# Patient Record
Sex: FEMALE | Race: WHITE | NOT HISPANIC OR LATINO | Employment: OTHER | URBAN - METROPOLITAN AREA
[De-identification: names, ages, dates, MRNs, and addresses within clinical notes are randomized per-mention and may not be internally consistent; named-entity substitution may affect disease eponyms.]

---

## 2023-04-28 DIAGNOSIS — E78.00 HYPERCHOLESTEROLEMIA: Primary | ICD-10-CM

## 2023-05-12 DIAGNOSIS — E11.9 TYPE 2 DIABETES MELLITUS WITHOUT COMPLICATIONS (MULTI): ICD-10-CM

## 2023-05-12 RX ORDER — ATORVASTATIN CALCIUM 10 MG/1
TABLET, FILM COATED ORAL
Qty: 90 TABLET | Refills: 0 | Status: SHIPPED | OUTPATIENT
Start: 2023-05-12 | End: 2023-05-18 | Stop reason: SDUPTHER

## 2023-05-15 LAB
ESTIMATED AVERAGE GLUCOSE FOR HBA1C: 160 MG/DL
HEMOGLOBIN A1C/HEMOGLOBIN TOTAL IN BLOOD: 7.2 %

## 2023-05-18 ENCOUNTER — OFFICE VISIT (OUTPATIENT)
Dept: PRIMARY CARE | Facility: CLINIC | Age: 83
End: 2023-05-18
Payer: MEDICARE

## 2023-05-18 VITALS
OXYGEN SATURATION: 96 % | WEIGHT: 179.8 LBS | SYSTOLIC BLOOD PRESSURE: 130 MMHG | HEART RATE: 75 BPM | BODY MASS INDEX: 30.7 KG/M2 | HEIGHT: 64 IN | DIASTOLIC BLOOD PRESSURE: 64 MMHG

## 2023-05-18 DIAGNOSIS — N18.4 CONTROLLED TYPE 2 DIABETES MELLITUS WITH STAGE 4 CHRONIC KIDNEY DISEASE, WITHOUT LONG-TERM CURRENT USE OF INSULIN (MULTI): ICD-10-CM

## 2023-05-18 DIAGNOSIS — Z12.31 BREAST CANCER SCREENING BY MAMMOGRAM: ICD-10-CM

## 2023-05-18 DIAGNOSIS — E11.9 TYPE 2 DIABETES MELLITUS WITHOUT COMPLICATION, WITHOUT LONG-TERM CURRENT USE OF INSULIN (MULTI): ICD-10-CM

## 2023-05-18 DIAGNOSIS — E11.22 CONTROLLED TYPE 2 DIABETES MELLITUS WITH STAGE 4 CHRONIC KIDNEY DISEASE, WITHOUT LONG-TERM CURRENT USE OF INSULIN (MULTI): ICD-10-CM

## 2023-05-18 DIAGNOSIS — E78.00 HYPERCHOLESTEROLEMIA: ICD-10-CM

## 2023-05-18 DIAGNOSIS — M81.0 AGE-RELATED OSTEOPOROSIS WITHOUT CURRENT PATHOLOGICAL FRACTURE: ICD-10-CM

## 2023-05-18 DIAGNOSIS — N18.4 CHRONIC KIDNEY DISEASE WITH ACTIVE MEDICAL MANAGEMENT WITHOUT DIALYSIS, STAGE 4 (SEVERE) (MULTI): ICD-10-CM

## 2023-05-18 DIAGNOSIS — I10 PRIMARY HYPERTENSION: Primary | ICD-10-CM

## 2023-05-18 PROBLEM — H40.9 GLAUCOMA: Status: ACTIVE | Noted: 2023-05-18

## 2023-05-18 PROBLEM — D53.9 MACROCYTIC ANEMIA: Status: ACTIVE | Noted: 2023-05-18

## 2023-05-18 PROBLEM — R92.8 MAMMOGRAM ABNORMAL: Status: ACTIVE | Noted: 2023-05-18

## 2023-05-18 PROBLEM — N63.10 BREAST MASS, RIGHT: Status: ACTIVE | Noted: 2023-05-18

## 2023-05-18 PROBLEM — M25.551 RIGHT HIP PAIN: Status: ACTIVE | Noted: 2023-05-18

## 2023-05-18 PROBLEM — D64.9 ANEMIA: Status: ACTIVE | Noted: 2023-05-18

## 2023-05-18 PROBLEM — H90.3 BILATERAL SENSORINEURAL HEARING LOSS: Status: ACTIVE | Noted: 2023-05-18

## 2023-05-18 PROBLEM — E87.5 HYPERKALEMIA: Status: ACTIVE | Noted: 2023-05-18

## 2023-05-18 PROBLEM — N28.9 RENAL INSUFFICIENCY: Status: ACTIVE | Noted: 2023-05-18

## 2023-05-18 PROCEDURE — 1160F RVW MEDS BY RX/DR IN RCRD: CPT | Performed by: INTERNAL MEDICINE

## 2023-05-18 PROCEDURE — 99214 OFFICE O/P EST MOD 30 MIN: CPT | Performed by: INTERNAL MEDICINE

## 2023-05-18 PROCEDURE — 1036F TOBACCO NON-USER: CPT | Performed by: INTERNAL MEDICINE

## 2023-05-18 PROCEDURE — 1159F MED LIST DOCD IN RCRD: CPT | Performed by: INTERNAL MEDICINE

## 2023-05-18 PROCEDURE — 3075F SYST BP GE 130 - 139MM HG: CPT | Performed by: INTERNAL MEDICINE

## 2023-05-18 PROCEDURE — 3078F DIAST BP <80 MM HG: CPT | Performed by: INTERNAL MEDICINE

## 2023-05-18 RX ORDER — METOPROLOL TARTRATE 50 MG/1
50 TABLET ORAL
Qty: 180 TABLET | Refills: 3 | Status: SHIPPED | OUTPATIENT
Start: 2023-05-18 | End: 2024-05-31

## 2023-05-18 RX ORDER — SITAGLIPTIN 100 MG/1
100 TABLET, FILM COATED ORAL DAILY
COMMUNITY
End: 2023-12-13 | Stop reason: SDUPTHER

## 2023-05-18 RX ORDER — LYSINE HCL 500 MG
1 TABLET ORAL DAILY
COMMUNITY

## 2023-05-18 RX ORDER — PREDNISOLONE ACETATE 10 MG/ML
SUSPENSION/ DROPS OPHTHALMIC
COMMUNITY
Start: 2023-01-24

## 2023-05-18 RX ORDER — GLYBURIDE 2.5 MG/1
2.5 TABLET ORAL DAILY
Qty: 90 TABLET | Refills: 3 | Status: SHIPPED | OUTPATIENT
Start: 2023-05-18 | End: 2023-08-22 | Stop reason: SDUPTHER

## 2023-05-18 RX ORDER — ATORVASTATIN CALCIUM 10 MG/1
10 TABLET, FILM COATED ORAL DAILY
Qty: 90 TABLET | Refills: 3 | Status: SHIPPED | OUTPATIENT
Start: 2023-05-18 | End: 2023-08-15 | Stop reason: SDUPTHER

## 2023-05-18 RX ORDER — INSULIN GLARGINE 100 [IU]/ML
INJECTION, SOLUTION SUBCUTANEOUS
COMMUNITY
End: 2023-12-13 | Stop reason: SDUPTHER

## 2023-05-18 RX ORDER — PEN NEEDLE, DIABETIC 31 GX5/16"
NEEDLE, DISPOSABLE MISCELLANEOUS
COMMUNITY
Start: 2023-03-12 | End: 2024-01-18

## 2023-05-18 RX ORDER — GLYBURIDE 2.5 MG/1
2.5 TABLET ORAL DAILY
COMMUNITY
End: 2023-05-18 | Stop reason: SDUPTHER

## 2023-05-18 RX ORDER — METOPROLOL TARTRATE 50 MG/1
50 TABLET ORAL
COMMUNITY
Start: 2012-04-20 | End: 2023-05-18 | Stop reason: SDUPTHER

## 2023-05-18 RX ORDER — BRIMONIDINE TARTRATE, TIMOLOL MALEATE 2; 5 MG/ML; MG/ML
SOLUTION/ DROPS OPHTHALMIC
COMMUNITY
Start: 2016-04-08

## 2023-05-18 RX ORDER — LISINOPRIL 40 MG/1
40 TABLET ORAL DAILY
Qty: 90 TABLET | Refills: 3 | Status: SHIPPED | OUTPATIENT
Start: 2023-05-18

## 2023-05-18 RX ORDER — ALENDRONATE SODIUM 70 MG/1
70 TABLET ORAL
COMMUNITY
End: 2023-05-18 | Stop reason: SDUPTHER

## 2023-05-18 RX ORDER — DOXAZOSIN 2 MG/1
2 TABLET ORAL DAILY
COMMUNITY
End: 2023-12-13 | Stop reason: SINTOL

## 2023-05-18 RX ORDER — BLOOD SUGAR DIAGNOSTIC
STRIP MISCELLANEOUS
COMMUNITY
End: 2023-11-27 | Stop reason: SDUPTHER

## 2023-05-18 RX ORDER — LISINOPRIL 40 MG/1
40 TABLET ORAL DAILY
COMMUNITY
End: 2023-05-18 | Stop reason: SDUPTHER

## 2023-05-18 RX ORDER — CYANOCOBALAMIN (VITAMIN B-12) 500 MCG
1 TABLET ORAL DAILY
COMMUNITY

## 2023-05-18 RX ORDER — ALENDRONATE SODIUM 70 MG/1
70 TABLET ORAL
Qty: 13 TABLET | Refills: 3 | Status: SHIPPED | OUTPATIENT
Start: 2023-05-18 | End: 2024-02-29 | Stop reason: SDUPTHER

## 2023-05-18 RX ORDER — AMLODIPINE BESYLATE 10 MG/1
10 TABLET ORAL DAILY
COMMUNITY
End: 2023-12-12 | Stop reason: SDUPTHER

## 2023-05-18 RX ORDER — TRAVOPROST OPHTHALMIC SOLUTION 0.04 MG/ML
1 SOLUTION OPHTHALMIC NIGHTLY
COMMUNITY
Start: 2023-04-13

## 2023-05-18 ASSESSMENT — PATIENT HEALTH QUESTIONNAIRE - PHQ9
1. LITTLE INTEREST OR PLEASURE IN DOING THINGS: NOT AT ALL
SUM OF ALL RESPONSES TO PHQ9 QUESTIONS 1 AND 2: 0
2. FEELING DOWN, DEPRESSED OR HOPELESS: NOT AT ALL

## 2023-05-18 ASSESSMENT — ENCOUNTER SYMPTOMS
HEMATURIA: 0
FEVER: 0
FREQUENCY: 0
HEADACHES: 0
SORE THROAT: 0
ARTHRALGIAS: 0
COUGH: 0
MYALGIAS: 0
NAUSEA: 0
DIARRHEA: 0
DIFFICULTY URINATING: 0
LIGHT-HEADEDNESS: 0
SHORTNESS OF BREATH: 0
DYSURIA: 0
CHILLS: 0
WEAKNESS: 0
PALPITATIONS: 0
CONSTIPATION: 0
VOMITING: 0
DIZZINESS: 0
FATIGUE: 0

## 2023-05-18 ASSESSMENT — PAIN SCALES - GENERAL: PAINLEVEL: 0-NO PAIN

## 2023-05-18 NOTE — ASSESSMENT & PLAN NOTE
Patient's A1c is 7.2% so she was encouraged to work on her diet and we will check another A1c prior to her next appointment which will be in 4 months

## 2023-05-18 NOTE — PATIENT INSTRUCTIONS
Follow up Dr Vance in 4 months for HTN, DM etc    Please get A1c before next appointment  (but after 8/15/23)

## 2023-05-18 NOTE — ASSESSMENT & PLAN NOTE
Patient was reminded to watch her NSAID usage and continue drinking fluids and this will be checked periodically

## 2023-05-18 NOTE — ASSESSMENT & PLAN NOTE
>>ASSESSMENT AND PLAN FOR TYPE 2 DIABETES MELLITUS (CMS/[facility]) WRITTEN ON 5/18/2023  1:57 PM BY BRUCE KRAFT MD    Patient's A1c is 7.2% so she was encouraged to work on her diet and we will check another A1c prior to her next appointment which will be in 4 months

## 2023-05-18 NOTE — ASSESSMENT & PLAN NOTE
Patient was given a refill on her alendronate and reminded to continue taking calcium and vitamin D.

## 2023-05-18 NOTE — PROGRESS NOTES
"Subjective   Patient ID: Hyacinth Crisostomo is a 83 y.o. female who presents for 4 month re for diabetes management.  BN    Is here for 4-month follow-up on hypertension diabetes and medication management.  Her May most recent hemoglobin A1c done on May 15 at 7.2%.  She admits she is really not been following her diet and she was counseled and encouraged to start watching her carbohydrates.        Review of Systems   Constitutional:  Negative for chills, fatigue and fever.   HENT:  Negative for sore throat.    Eyes:  Negative for visual disturbance.   Respiratory:  Negative for cough and shortness of breath.    Cardiovascular:  Negative for chest pain, palpitations and leg swelling.   Gastrointestinal:  Negative for constipation, diarrhea, nausea and vomiting.   Genitourinary:  Negative for difficulty urinating, dysuria, frequency, hematuria and urgency.   Musculoskeletal:  Negative for arthralgias and myalgias.   Skin:  Negative for rash.   Neurological:  Negative for dizziness, syncope, weakness, light-headedness and headaches.       Objective   Medication Documentation Review Audit       Reviewed by Lucita Vance MD (Physician) on 05/18/23 at 1354      Medication Order Taking? Sig Documenting Provider Last Dose Status     Discontinued 05/18/23 1342   alendronate (Fosamax) 70 mg tablet [id number]  Take 1 tablet (70 mg) by mouth 1 (one) time per week. Lucita Vance MD  Active   amLODIPine (Norvasc) 10 mg tablet [id number]  Take 1 tablet (10 mg) by mouth once daily. Historical Provider, MD  Active     Discontinued 05/18/23 1342   atorvastatin (Lipitor) 10 mg tablet [id number]  Take 1 tablet (10 mg) by mouth once daily. as directed Lucita Vance MD  Active   BD Ultra-Fine Short Pen Needle 31 gauge x 5/16\" needle [id number]  USE AS DIRECTED. Historical Provider, MD  Active   calcium carbonate-vit D3-min 600 mg calcium- 400 unit tablet [id number]  Take 1 tablet by mouth once daily. Historical Provider, MD  Active "   cholecalciferol (Vitamin D-3) 10 MCG (400 UNIT) tablet [id number]  Take 1 tablet (10 mcg) by mouth once daily. Historical Provider, MD  Active   Combigan 0.2-0.5 % ophthalmic solution [id number]  Administer into affected eye(s). Historical Provider, MD  Active   doxazosin (Cardura) 2 mg tablet [id number]  Take 1 tablet (2 mg) by mouth once daily. Historical Provider, MD  Active     Discontinued 05/18/23 1342   glyBURIDE (Diabeta) 2.5 mg tablet [id number]  Take 1 tablet (2.5 mg) by mouth once daily. Lucita Vance MD  Active   Januvia 100 mg tablet [id number]  Take 1 tablet (100 mg) by mouth once daily. Historical Provider, MD  Active   Lantus Solostar U-100 Insulin 100 unit/mL (3 mL) pen [id number]  INJECT 15 UNITS EVERY NIGHT. Historical Provider, MD  Active     Discontinued 05/18/23 1342   lisinopril 40 mg tablet [id number]  Take 1 tablet (40 mg) by mouth once daily. Lucita Vance MD  Active     Discontinued 05/18/23 1342   metoprolol tartrate (Lopressor) 50 mg tablet [id number]  Take 1 tablet (50 mg) by mouth 2 times a day with meals. Lucita Vance MD  Active   OneTouch Ultra Test strip [id number]  USE 1 STRIP DAILY AND AS NEEDED IF SYMPTOMATIC Historical Provider, MD  Active   prednisoLONE acetate (Pred-Forte) 1 % ophthalmic suspension [id number]  INSTILL 1 DROP INTO LEFT EYE ONCE A DAY Historical Provider, MD  Active   travoprost (Travatan Z) 0.004 % drops ophthalmic solution [id number]  Administer 1 drop into both eyes once daily at bedtime. Historical Provider, MD  Active                  Physical Exam  Constitutional:       Appearance: Normal appearance.   HENT:      Head: Normocephalic and atraumatic.      Nose: Nose normal.   Eyes:      Extraocular Movements: Extraocular movements intact.      Pupils: Pupils are equal, round, and reactive to light.   Cardiovascular:      Rate and Rhythm: Normal rate and regular rhythm.   Pulmonary:      Breath sounds: Normal breath sounds.   Abdominal:      General: Abdomen is flat.  "Bowel sounds are normal.      Palpations: Abdomen is soft.   Musculoskeletal:      Right lower leg: No edema.      Left lower leg: No edema.   Neurological:      Mental Status: She is alert.     /64   Pulse 75   Ht 1.626 m (5' 4\")   Wt 81.6 kg (179 lb 12.8 oz)   SpO2 96%   BMI 30.86 kg/m²       Assessment/Plan   Problem List Items Addressed This Visit          Circulatory    Hypertension - Primary     Blood pressure is stable at 130/64 so she was given refills on lisinopril and metoprolol.         Relevant Medications    lisinopril 40 mg tablet    metoprolol tartrate (Lopressor) 50 mg tablet       Genitourinary    Chronic kidney disease with active medical management without dialysis, stage 4 (severe) (CMS/[facility])     Patient was reminded to watch her NSAID usage and continue drinking fluids and this will be checked periodically            Musculoskeletal    Age related osteoporosis     Patient was given a refill on her alendronate and reminded to continue taking calcium and vitamin D.         Relevant Medications    alendronate (Fosamax) 70 mg tablet       Endocrine/Metabolic    Controlled diabetes mellitus with chronic kidney disease (CMS/[facility])    Type 2 diabetes mellitus (CMS/[facility])     Patient's A1c is 7.2% so she was encouraged to work on her diet and we will check another A1c prior to her next appointment which will be in 4 months         Relevant Medications    atorvastatin (Lipitor) 10 mg tablet    lisinopril 40 mg tablet    glyBURIDE (Diabeta) 2.5 mg tablet    Other Relevant Orders    Hemoglobin A1C       Other    Hypercholesterolemia     Patient was given a refill on her atorvastatin and cholesterol profile was checked in January         Relevant Medications    atorvastatin (Lipitor) 10 mg tablet     Other Visit Diagnoses       Breast cancer screening by mammogram        Relevant Orders    BI mammo bilateral screening tomosynthesis                   It has been a pleasure seeing you.    "

## 2023-05-24 RX ORDER — GLYBURIDE 2.5 MG/1
TABLET ORAL
Qty: 90 TABLET | Refills: 3 | OUTPATIENT
Start: 2023-05-24

## 2023-06-15 LAB
ANION GAP IN SER/PLAS: 15 MMOL/L (ref 10–20)
APPEARANCE, URINE: ABNORMAL
BACTERIA, URINE: ABNORMAL /HPF
BILIRUBIN, URINE: NEGATIVE
BLOOD, URINE: NEGATIVE
CALCIDIOL (25 OH VITAMIN D3) (NG/ML) IN SER/PLAS: 37 NG/ML
CALCIUM (MG/DL) IN SER/PLAS: 9.3 MG/DL (ref 8.6–10.6)
CARBON DIOXIDE, TOTAL (MMOL/L) IN SER/PLAS: 22 MMOL/L (ref 21–32)
CHLORIDE (MMOL/L) IN SER/PLAS: 107 MMOL/L (ref 98–107)
COLOR, URINE: YELLOW
CREATININE (MG/DL) IN SER/PLAS: 1.92 MG/DL (ref 0.5–1.05)
CREATININE (MG/DL) IN URINE: 173 MG/DL (ref 20–320)
ERYTHROCYTE DISTRIBUTION WIDTH (RATIO) BY AUTOMATED COUNT: 12.4 % (ref 11.5–14.5)
ERYTHROCYTE MEAN CORPUSCULAR HEMOGLOBIN CONCENTRATION (G/DL) BY AUTOMATED: 31.4 G/DL (ref 32–36)
ERYTHROCYTE MEAN CORPUSCULAR VOLUME (FL) BY AUTOMATED COUNT: 95 FL (ref 80–100)
ERYTHROCYTES (10*6/UL) IN BLOOD BY AUTOMATED COUNT: 4.01 X10E12/L (ref 4–5.2)
GFR FEMALE: 26 ML/MIN/1.73M2
GLUCOSE (MG/DL) IN SER/PLAS: 228 MG/DL (ref 74–99)
GLUCOSE, URINE: NEGATIVE MG/DL
HEMATOCRIT (%) IN BLOOD BY AUTOMATED COUNT: 37.9 % (ref 36–46)
HEMOGLOBIN (G/DL) IN BLOOD: 11.9 G/DL (ref 12–16)
KETONES, URINE: NEGATIVE MG/DL
LEUKOCYTE ESTERASE, URINE: ABNORMAL
LEUKOCYTES (10*3/UL) IN BLOOD BY AUTOMATED COUNT: 8.6 X10E9/L (ref 4.4–11.3)
MAGNESIUM (MG/DL) IN SER/PLAS: 1.93 MG/DL (ref 1.6–2.4)
MUCUS, URINE: ABNORMAL /LPF
NITRITE, URINE: NEGATIVE
NRBC (PER 100 WBCS) BY AUTOMATED COUNT: 0 /100 WBC (ref 0–0)
PH, URINE: 5 (ref 5–8)
PHOSPHATE (MG/DL) IN SER/PLAS: 4.1 MG/DL (ref 2.5–4.9)
PLATELETS (10*3/UL) IN BLOOD AUTOMATED COUNT: 219 X10E9/L (ref 150–450)
POTASSIUM (MMOL/L) IN SER/PLAS: 5.3 MMOL/L (ref 3.5–5.3)
PROTEIN (MG/DL) IN URINE: 18 MG/DL (ref 5–24)
PROTEIN, URINE: NEGATIVE MG/DL
PROTEIN/CREATININE (MG/MG) IN URINE: 0.1 MG/MG CREAT (ref 0–0.17)
RBC, URINE: 4 /HPF (ref 0–5)
SODIUM (MMOL/L) IN SER/PLAS: 139 MMOL/L (ref 136–145)
SPECIFIC GRAVITY, URINE: 1.01 (ref 1–1.03)
SQUAMOUS EPITHELIAL CELLS, URINE: 7 /HPF
URATE (MG/DL) IN SER/PLAS: 7.4 MG/DL (ref 2.3–6.7)
UREA NITROGEN (MG/DL) IN SER/PLAS: 33 MG/DL (ref 6–23)
UROBILINOGEN, URINE: <2 MG/DL (ref 0–1.9)
WBC CLUMPS, URINE: ABNORMAL /HPF
WBC, URINE: 33 /HPF (ref 0–5)

## 2023-07-26 ENCOUNTER — TELEPHONE (OUTPATIENT)
Dept: PRIMARY CARE | Facility: CLINIC | Age: 83
End: 2023-07-26
Payer: MEDICARE

## 2023-07-26 NOTE — TELEPHONE ENCOUNTER
Patient notified and read message.  BN    ----- Message from Lucita Vance MD sent at 7/26/2023  7:45 AM EDT -----  Please notify patient that her mammogram and ultrasound both show that the lesion in question has decreased in size and is therefore benign.  No further follow-up is needed at this time.    See both mammogram and ultrasound results management notes.

## 2023-08-15 DIAGNOSIS — E11.9 TYPE 2 DIABETES MELLITUS WITHOUT COMPLICATION, WITHOUT LONG-TERM CURRENT USE OF INSULIN (MULTI): ICD-10-CM

## 2023-08-15 DIAGNOSIS — E78.00 HYPERCHOLESTEROLEMIA: ICD-10-CM

## 2023-08-15 RX ORDER — ATORVASTATIN CALCIUM 10 MG/1
10 TABLET, FILM COATED ORAL DAILY
Qty: 90 TABLET | Refills: 0 | Status: SHIPPED | OUTPATIENT
Start: 2023-08-15 | End: 2023-12-12 | Stop reason: SDUPTHER

## 2023-08-15 NOTE — TELEPHONE ENCOUNTER
Med refill form voicemail    atorvastatin (Lipitor) 10 mg tablet      Take 1 tablet (10 mg) by mouth once daily.     GUY CROWDER

## 2023-08-16 ENCOUNTER — LAB (OUTPATIENT)
Dept: LAB | Facility: LAB | Age: 83
End: 2023-08-16
Payer: MEDICARE

## 2023-08-16 DIAGNOSIS — E11.9 TYPE 2 DIABETES MELLITUS WITHOUT COMPLICATION, WITHOUT LONG-TERM CURRENT USE OF INSULIN (MULTI): ICD-10-CM

## 2023-08-16 PROCEDURE — 83036 HEMOGLOBIN GLYCOSYLATED A1C: CPT

## 2023-08-16 PROCEDURE — 36415 COLL VENOUS BLD VENIPUNCTURE: CPT

## 2023-08-17 LAB
ESTIMATED AVERAGE GLUCOSE FOR HBA1C: 146 MG/DL
HEMOGLOBIN A1C/HEMOGLOBIN TOTAL IN BLOOD: 6.7 %

## 2023-08-22 ENCOUNTER — OFFICE VISIT (OUTPATIENT)
Dept: PRIMARY CARE | Facility: CLINIC | Age: 83
End: 2023-08-22
Payer: MEDICARE

## 2023-08-22 VITALS
BODY MASS INDEX: 30.19 KG/M2 | HEIGHT: 64 IN | SYSTOLIC BLOOD PRESSURE: 119 MMHG | OXYGEN SATURATION: 96 % | DIASTOLIC BLOOD PRESSURE: 58 MMHG | WEIGHT: 176.8 LBS | HEART RATE: 65 BPM

## 2023-08-22 DIAGNOSIS — E11.9 TYPE 2 DIABETES MELLITUS WITHOUT COMPLICATION, WITHOUT LONG-TERM CURRENT USE OF INSULIN (MULTI): ICD-10-CM

## 2023-08-22 DIAGNOSIS — I10 PRIMARY HYPERTENSION: Primary | ICD-10-CM

## 2023-08-22 DIAGNOSIS — E78.00 HYPERCHOLESTEROLEMIA: ICD-10-CM

## 2023-08-22 DIAGNOSIS — N18.4 CHRONIC KIDNEY DISEASE WITH ACTIVE MEDICAL MANAGEMENT WITHOUT DIALYSIS, STAGE 4 (SEVERE) (MULTI): ICD-10-CM

## 2023-08-22 PROBLEM — N28.9 RENAL INSUFFICIENCY: Status: ACTIVE | Noted: 2023-08-22

## 2023-08-22 PROBLEM — M25.551 RIGHT HIP PAIN: Status: RESOLVED | Noted: 2023-05-18 | Resolved: 2023-08-22

## 2023-08-22 PROBLEM — N63.10 BREAST MASS, RIGHT: Status: RESOLVED | Noted: 2023-05-18 | Resolved: 2023-08-22

## 2023-08-22 PROBLEM — D53.9 MACROCYTIC ANEMIA: Status: RESOLVED | Noted: 2023-05-18 | Resolved: 2023-08-22

## 2023-08-22 PROBLEM — D64.9 ANEMIA: Status: RESOLVED | Noted: 2023-05-18 | Resolved: 2023-08-22

## 2023-08-22 PROCEDURE — 1126F AMNT PAIN NOTED NONE PRSNT: CPT | Performed by: INTERNAL MEDICINE

## 2023-08-22 PROCEDURE — 3078F DIAST BP <80 MM HG: CPT | Performed by: INTERNAL MEDICINE

## 2023-08-22 PROCEDURE — 1036F TOBACCO NON-USER: CPT | Performed by: INTERNAL MEDICINE

## 2023-08-22 PROCEDURE — 1159F MED LIST DOCD IN RCRD: CPT | Performed by: INTERNAL MEDICINE

## 2023-08-22 PROCEDURE — 3074F SYST BP LT 130 MM HG: CPT | Performed by: INTERNAL MEDICINE

## 2023-08-22 PROCEDURE — 1160F RVW MEDS BY RX/DR IN RCRD: CPT | Performed by: INTERNAL MEDICINE

## 2023-08-22 PROCEDURE — 99214 OFFICE O/P EST MOD 30 MIN: CPT | Performed by: INTERNAL MEDICINE

## 2023-08-22 RX ORDER — GLYBURIDE 2.5 MG/1
2.5 TABLET ORAL DAILY
Qty: 90 TABLET | Refills: 3 | Status: SHIPPED | OUTPATIENT
Start: 2023-08-22 | End: 2024-02-29 | Stop reason: SDUPTHER

## 2023-08-22 ASSESSMENT — ENCOUNTER SYMPTOMS
FATIGUE: 0
SORE THROAT: 0
HEADACHES: 0
HEMATURIA: 0
DIARRHEA: 0
FEVER: 0
MYALGIAS: 0
NAUSEA: 0
DYSURIA: 0
CHILLS: 0
PALPITATIONS: 0
LIGHT-HEADEDNESS: 0
DIZZINESS: 0
VOMITING: 0
SHORTNESS OF BREATH: 0
ARTHRALGIAS: 0
CONSTIPATION: 0
WEAKNESS: 0
COUGH: 0
DIFFICULTY URINATING: 0
FREQUENCY: 0

## 2023-08-22 ASSESSMENT — PATIENT HEALTH QUESTIONNAIRE - PHQ9
2. FEELING DOWN, DEPRESSED OR HOPELESS: NOT AT ALL
1. LITTLE INTEREST OR PLEASURE IN DOING THINGS: NOT AT ALL
SUM OF ALL RESPONSES TO PHQ9 QUESTIONS 1 AND 2: 0

## 2023-08-22 ASSESSMENT — PAIN SCALES - GENERAL: PAINLEVEL: 0-NO PAIN

## 2023-08-22 NOTE — ASSESSMENT & PLAN NOTE
>>ASSESSMENT AND PLAN FOR TYPE 2 DIABETES MELLITUS (CMS/[facility]) WRITTEN ON 8/22/2023  2:29 PM BY BRUCE KRAFT MD    Diabetes is doing well on her current A1c is 6.7%.  She will check another A1c prior to her next appointment in 4 months

## 2023-08-22 NOTE — PROGRESS NOTES
"Subjective   Patient ID: Hyacinth Crisostomo is a 83 y.o. female who presents for 4 month follow up for HTN, cholesterol, and diabetes management.  BN    Patient is here for routine follow-up of hypertension, cholesterol, diabetes and medication management.    Patient had a hemoglobin A1c on August 16 and it was 6.7% which had improved compared to her prior reading at 7.2%.  She admits she really has not changed much and thinks that she just that it is summertime and she is not more active which is why her A1c has improved.        Review of Systems   Constitutional:  Negative for chills, fatigue and fever.   HENT:  Negative for sore throat.    Eyes:  Negative for visual disturbance.   Respiratory:  Negative for cough and shortness of breath.    Cardiovascular:  Negative for chest pain, palpitations and leg swelling.   Gastrointestinal:  Negative for constipation, diarrhea, nausea and vomiting.   Genitourinary:  Negative for difficulty urinating, dysuria, frequency, hematuria and urgency.   Musculoskeletal:  Negative for arthralgias and myalgias.   Skin:  Negative for rash.   Neurological:  Negative for dizziness, syncope, weakness, light-headedness and headaches.       Objective   Medication Documentation Review Audit       Reviewed by Lucita Vance MD (Physician) on 05/18/23 at 1354      Medication Order Taking? Sig Documenting Provider Last Dose Status     Discontinued 05/18/23 1342   alendronate (Fosamax) 70 mg tablet [id number]  Take 1 tablet (70 mg) by mouth 1 (one) time per week. Lucita Vance MD  Active   amLODIPine (Norvasc) 10 mg tablet [id number]  Take 1 tablet (10 mg) by mouth once daily. Historical Provider, MD  Active     Discontinued 05/18/23 1342   atorvastatin (Lipitor) 10 mg tablet [id number]  Take 1 tablet (10 mg) by mouth once daily. as directed Lucita Vance MD  Active   BD Ultra-Fine Short Pen Needle 31 gauge x 5/16\" needle [id number]  USE AS DIRECTED. Historical Provider, MD  Active   calcium " carbonate-vit D3-min 600 mg calcium- 400 unit tablet [id number]  Take 1 tablet by mouth once daily. Historical Provider, MD  Active   cholecalciferol (Vitamin D-3) 10 MCG (400 UNIT) tablet [id number]  Take 1 tablet (10 mcg) by mouth once daily. Historical Provider, MD  Active   Combigan 0.2-0.5 % ophthalmic solution [id number]  Administer into affected eye(s). Historical Provider, MD  Active   doxazosin (Cardura) 2 mg tablet [id number]  Take 1 tablet (2 mg) by mouth once daily. Historical Provider, MD  Active     Discontinued 05/18/23 1342   glyBURIDE (Diabeta) 2.5 mg tablet [id number]  Take 1 tablet (2.5 mg) by mouth once daily. Lucita Vance MD  Active   Januvia 100 mg tablet [id number]  Take 1 tablet (100 mg) by mouth once daily. Historical Provider, MD  Active   Lantus Solostar U-100 Insulin 100 unit/mL (3 mL) pen [id number]  INJECT 15 UNITS EVERY NIGHT. Historical Provider, MD  Active     Discontinued 05/18/23 1342   lisinopril 40 mg tablet [id number]  Take 1 tablet (40 mg) by mouth once daily. Lucita Vance MD  Active     Discontinued 05/18/23 1342   metoprolol tartrate (Lopressor) 50 mg tablet [id number]  Take 1 tablet (50 mg) by mouth 2 times a day with meals. Lucita Vance MD  Active   OneTouch Ultra Test strip [id number]  USE 1 STRIP DAILY AND AS NEEDED IF SYMPTOMATIC Historical Provider, MD  Active   prednisoLONE acetate (Pred-Forte) 1 % ophthalmic suspension [id number]  INSTILL 1 DROP INTO LEFT EYE ONCE A DAY Historical Provider, MD  Active   travoprost (Travatan Z) 0.004 % drops ophthalmic solution [id number]  Administer 1 drop into both eyes once daily at bedtime. Historical Provider, MD  Active                  Physical Exam  Constitutional:       Appearance: Normal appearance.   HENT:      Head: Normocephalic and atraumatic.      Nose: Nose normal.   Eyes:      Extraocular Movements: Extraocular movements intact.      Pupils: Pupils are equal, round, and reactive to light.   Cardiovascular:      Rate and Rhythm:  "Normal rate and regular rhythm.   Pulmonary:      Breath sounds: Normal breath sounds.   Abdominal:      General: Abdomen is flat. Bowel sounds are normal.      Palpations: Abdomen is soft.   Musculoskeletal:      Right lower leg: No edema.      Left lower leg: No edema.   Neurological:      Mental Status: She is alert.     /58 (BP Location: Left arm, Patient Position: Sitting)   Pulse 65   Ht 1.626 m (5' 4\")   Wt 80.2 kg (176 lb 12.8 oz)   SpO2 96%   BMI 30.35 kg/m²       Assessment/Plan   Problem List Items Addressed This Visit       Chronic kidney disease with active medical management without dialysis, stage 4 (severe) (CMS/[facility])     Patient was reminded to drink plenty of fluids and avoid NSAIDs.         Type 2 diabetes mellitus (CMS/[facility])     Diabetes is doing well on her current A1c is 6.7%.  She will check another A1c prior to her next appointment in 4 months         Relevant Medications    glyBURIDE (Diabeta) 2.5 mg tablet    Other Relevant Orders    Hemoglobin A1C    Hypercholesterolemia     Cholesterol is stable on atorvastatin 10 mg daily and annual labs were done in January.         Hypertension - Primary     Blood pressure is stable and well-controlled.          Follow-up in 4 months with an A1c at that time for diabetes, hypertension and cholesterol.  Annual wellness visit and annual labs are due in January 2024         It has been a pleasure seeing you.    "

## 2023-08-22 NOTE — ASSESSMENT & PLAN NOTE
Diabetes is doing well on her current A1c is 6.7%.  She will check another A1c prior to her next appointment in 4 months

## 2023-11-05 DIAGNOSIS — E11.9 TYPE 2 DIABETES MELLITUS WITHOUT COMPLICATIONS (MULTI): ICD-10-CM

## 2023-11-07 RX ORDER — BLOOD SUGAR DIAGNOSTIC
STRIP MISCELLANEOUS
Qty: 100 STRIP | Refills: 2 | OUTPATIENT
Start: 2023-11-07

## 2023-11-22 ENCOUNTER — LAB (OUTPATIENT)
Dept: LAB | Facility: LAB | Age: 83
End: 2023-11-22
Payer: MEDICARE

## 2023-11-22 DIAGNOSIS — E11.9 TYPE 2 DIABETES MELLITUS WITHOUT COMPLICATION, WITHOUT LONG-TERM CURRENT USE OF INSULIN (MULTI): ICD-10-CM

## 2023-11-22 DIAGNOSIS — N18.4 CHRONIC KIDNEY DISEASE, STAGE 4 (SEVERE) (MULTI): Primary | ICD-10-CM

## 2023-11-22 PROCEDURE — 80048 BASIC METABOLIC PNL TOTAL CA: CPT

## 2023-11-22 PROCEDURE — 36415 COLL VENOUS BLD VENIPUNCTURE: CPT

## 2023-11-22 PROCEDURE — 83036 HEMOGLOBIN GLYCOSYLATED A1C: CPT

## 2023-11-23 LAB
ANION GAP SERPL CALC-SCNC: 15 MMOL/L (ref 10–20)
BUN SERPL-MCNC: 48 MG/DL (ref 6–23)
CALCIUM SERPL-MCNC: 9.4 MG/DL (ref 8.6–10.6)
CHLORIDE SERPL-SCNC: 111 MMOL/L (ref 98–107)
CO2 SERPL-SCNC: 21 MMOL/L (ref 21–32)
CREAT SERPL-MCNC: 1.91 MG/DL (ref 0.5–1.05)
EST. AVERAGE GLUCOSE BLD GHB EST-MCNC: 154 MG/DL
GFR SERPL CREATININE-BSD FRML MDRD: 26 ML/MIN/1.73M*2
GLUCOSE SERPL-MCNC: 138 MG/DL (ref 74–99)
HBA1C MFR BLD: 7 %
POTASSIUM SERPL-SCNC: 5.1 MMOL/L (ref 3.5–5.3)
SODIUM SERPL-SCNC: 142 MMOL/L (ref 136–145)

## 2023-11-27 DIAGNOSIS — N18.4 CONTROLLED TYPE 2 DIABETES MELLITUS WITH STAGE 4 CHRONIC KIDNEY DISEASE, WITHOUT LONG-TERM CURRENT USE OF INSULIN (MULTI): ICD-10-CM

## 2023-11-27 DIAGNOSIS — E11.22 CONTROLLED TYPE 2 DIABETES MELLITUS WITH STAGE 4 CHRONIC KIDNEY DISEASE, WITHOUT LONG-TERM CURRENT USE OF INSULIN (MULTI): ICD-10-CM

## 2023-11-27 RX ORDER — BLOOD SUGAR DIAGNOSTIC
STRIP MISCELLANEOUS
Qty: 100 STRIP | Refills: 0 | Status: SHIPPED | OUTPATIENT
Start: 2023-11-27 | End: 2023-12-12 | Stop reason: SDUPTHER

## 2023-12-12 ENCOUNTER — OFFICE VISIT (OUTPATIENT)
Dept: PRIMARY CARE | Facility: CLINIC | Age: 83
End: 2023-12-12
Payer: MEDICARE

## 2023-12-12 VITALS
HEIGHT: 64 IN | DIASTOLIC BLOOD PRESSURE: 52 MMHG | BODY MASS INDEX: 30.39 KG/M2 | HEART RATE: 67 BPM | SYSTOLIC BLOOD PRESSURE: 128 MMHG | OXYGEN SATURATION: 99 % | WEIGHT: 178 LBS

## 2023-12-12 DIAGNOSIS — E78.00 HYPERCHOLESTEROLEMIA: ICD-10-CM

## 2023-12-12 DIAGNOSIS — I10 PRIMARY HYPERTENSION: Primary | ICD-10-CM

## 2023-12-12 DIAGNOSIS — N18.4 CHRONIC KIDNEY DISEASE WITH ACTIVE MEDICAL MANAGEMENT WITHOUT DIALYSIS, STAGE 4 (SEVERE) (MULTI): ICD-10-CM

## 2023-12-12 DIAGNOSIS — E11.9 TYPE 2 DIABETES MELLITUS WITHOUT COMPLICATION, WITHOUT LONG-TERM CURRENT USE OF INSULIN (MULTI): ICD-10-CM

## 2023-12-12 DIAGNOSIS — N28.9 RENAL INSUFFICIENCY: ICD-10-CM

## 2023-12-12 DIAGNOSIS — N18.4 CONTROLLED TYPE 2 DIABETES MELLITUS WITH STAGE 4 CHRONIC KIDNEY DISEASE, WITHOUT LONG-TERM CURRENT USE OF INSULIN (MULTI): ICD-10-CM

## 2023-12-12 DIAGNOSIS — E11.22 CONTROLLED TYPE 2 DIABETES MELLITUS WITH STAGE 4 CHRONIC KIDNEY DISEASE, WITHOUT LONG-TERM CURRENT USE OF INSULIN (MULTI): ICD-10-CM

## 2023-12-12 PROCEDURE — 3074F SYST BP LT 130 MM HG: CPT | Performed by: INTERNAL MEDICINE

## 2023-12-12 PROCEDURE — 99214 OFFICE O/P EST MOD 30 MIN: CPT | Performed by: INTERNAL MEDICINE

## 2023-12-12 PROCEDURE — 1160F RVW MEDS BY RX/DR IN RCRD: CPT | Performed by: INTERNAL MEDICINE

## 2023-12-12 PROCEDURE — 1159F MED LIST DOCD IN RCRD: CPT | Performed by: INTERNAL MEDICINE

## 2023-12-12 PROCEDURE — 1126F AMNT PAIN NOTED NONE PRSNT: CPT | Performed by: INTERNAL MEDICINE

## 2023-12-12 PROCEDURE — 1036F TOBACCO NON-USER: CPT | Performed by: INTERNAL MEDICINE

## 2023-12-12 PROCEDURE — 3078F DIAST BP <80 MM HG: CPT | Performed by: INTERNAL MEDICINE

## 2023-12-12 RX ORDER — BLOOD SUGAR DIAGNOSTIC
STRIP MISCELLANEOUS
Qty: 100 STRIP | Refills: 3 | Status: SHIPPED | OUTPATIENT
Start: 2023-12-12 | End: 2024-02-29 | Stop reason: SDUPTHER

## 2023-12-12 RX ORDER — ATORVASTATIN CALCIUM 10 MG/1
10 TABLET, FILM COATED ORAL DAILY
Qty: 90 TABLET | Refills: 3 | Status: SHIPPED | OUTPATIENT
Start: 2023-12-12

## 2023-12-12 RX ORDER — AMLODIPINE BESYLATE 10 MG/1
10 TABLET ORAL DAILY
Qty: 90 TABLET | Refills: 3 | Status: SHIPPED | OUTPATIENT
Start: 2023-12-12 | End: 2024-02-29 | Stop reason: SDUPTHER

## 2023-12-12 ASSESSMENT — ENCOUNTER SYMPTOMS
DYSURIA: 0
SHORTNESS OF BREATH: 0
FEVER: 0
DIARRHEA: 0
FATIGUE: 0
DIFFICULTY URINATING: 0
HEMATURIA: 0
NAUSEA: 0
CHILLS: 0
VOMITING: 0
COUGH: 0
CONSTIPATION: 0
LIGHT-HEADEDNESS: 0
FREQUENCY: 0
DIZZINESS: 0
HEADACHES: 0
WEAKNESS: 0
ARTHRALGIAS: 0
PALPITATIONS: 0
MYALGIAS: 0
SORE THROAT: 0

## 2023-12-12 ASSESSMENT — PAIN SCALES - GENERAL: PAINLEVEL: 0-NO PAIN

## 2023-12-12 ASSESSMENT — PATIENT HEALTH QUESTIONNAIRE - PHQ9
SUM OF ALL RESPONSES TO PHQ9 QUESTIONS 1 AND 2: 0
1. LITTLE INTEREST OR PLEASURE IN DOING THINGS: NOT AT ALL
2. FEELING DOWN, DEPRESSED OR HOPELESS: NOT AT ALL

## 2023-12-12 NOTE — ASSESSMENT & PLAN NOTE
Patient's diabetes is stable with an A1c of 7%.  She is encouraged to work on her diet and watch the sugar cookies and [place] treats and we will check another A1c before her next appointment which will be the end of February early March.

## 2023-12-12 NOTE — ASSESSMENT & PLAN NOTE
Patient sees her nephrologist tomorrow.  Otherwise her renal function is at stage IV I will check another A1c and urine microalbumin for her diabetes in February.

## 2023-12-12 NOTE — PROGRESS NOTES
Subjective   Patient ID: Hyacinth Crisostomo is a 83 y.o. female who presents for 4 month follow up for diabetes, HTN, and cholesterol management.  BN    Patient is here for routine follow-up for diabetes cholesterol hypertension and medication management.  She feels well overall.  She notes that she has a an appointment with her nephrologist tomorrow and will have a colonoscopy with Dr. Adam in January.  Currently the patient admits to eating a little extra chocolate and cookies which she has been making for the holidays which may explain why her A1c is up to 7%.  She is encouraged to work on her low-carb and low sugar diet for diabetes        Review of Systems   Constitutional:  Negative for chills, fatigue and fever.   HENT:  Negative for sore throat.    Eyes:  Negative for visual disturbance.   Respiratory:  Negative for cough and shortness of breath.    Cardiovascular:  Negative for chest pain, palpitations and leg swelling.   Gastrointestinal:  Negative for constipation, diarrhea, nausea and vomiting.   Genitourinary:  Negative for difficulty urinating, dysuria, frequency, hematuria and urgency.   Musculoskeletal:  Negative for arthralgias and myalgias.   Skin:  Negative for rash.   Neurological:  Negative for dizziness, syncope, weakness, light-headedness and headaches.       Objective   Medication Documentation Review Audit       Reviewed by Lucita Vance MD (Physician) on 12/12/23 at 1437      Medication Order Taking? Sig Documenting Provider Last Dose Status   alendronate (Fosamax) 70 mg tablet [id number]  Take 1 tablet (70 mg) by mouth 1 (one) time per week. Lucita Vance MD  Active     Discontinued 12/12/23 1427   amLODIPine (Norvasc) 10 mg tablet [id number]  Take 1 tablet (10 mg) by mouth once daily. Lucita Vance MD  Active     Discontinued 12/12/23 1427   atorvastatin (Lipitor) 10 mg tablet [id number]  Take 1 tablet (10 mg) by mouth once daily. as directed Lucita Vance MD  Active   BD Ultra-Fine  "Short Pen Needle 31 gauge x 5/16\" needle [id number]  USE AS DIRECTED. Historical Provider, MD  Active   calcium carbonate-vit D3-min 600 mg calcium- 400 unit tablet [id number]  Take 1 tablet by mouth once daily. Historical Provider, MD  Active   cholecalciferol (Vitamin D-3) 10 MCG (400 UNIT) tablet [id number]  Take 1 tablet (10 mcg) by mouth once daily. Historical Provider, MD  Active   Combigan 0.2-0.5 % ophthalmic solution [id number]  Administer into affected eye(s). Historical Provider, MD  Active   doxazosin (Cardura) 2 mg tablet [id number]  Take 1 tablet (2 mg) by mouth once daily. Historical Provider, MD  Active   glyBURIDE (Diabeta) 2.5 mg tablet [id number]  Take 1 tablet (2.5 mg) by mouth once daily. Lucita Vance MD  Active   Januvia 100 mg tablet [id number]  Take 1 tablet (100 mg) by mouth once daily. Historical Provider, MD  Active   Lantus Solostar U-100 Insulin 100 unit/mL (3 mL) pen [id number]  INJECT 15 UNITS EVERY NIGHT. Historical Provider, MD  Active   lisinopril 40 mg tablet [id number]  Take 1 tablet (40 mg) by mouth once daily. Lucita Vance MD  Active   metoprolol tartrate (Lopressor) 50 mg tablet [id number]  Take 1 tablet (50 mg) by mouth 2 times a day with meals. Lucita Vance MD  Active     Discontinued 12/12/23 1427   OneTouch Ultra Test strip [id number]  USE 1 STRIP DAILY AND AS NEEDED IF SYMPTOMATIC Lucita Vance MD  Active   prednisoLONE acetate (Pred-Forte) 1 % ophthalmic suspension [id number]  INSTILL 1 DROP INTO LEFT EYE ONCE A DAY Historical Provider, MD  Active   travoprost (Travatan Z) 0.004 % drops ophthalmic solution [id number]  Administer 1 drop into both eyes once daily at bedtime. Historical Provider, MD  Active                  Allergies   Allergen Reactions    Cpm-Pseudoephed-Acetaminophen Hives     Physical Exam  Constitutional:       Appearance: Normal appearance.   HENT:      Head: Normocephalic and atraumatic.      Nose: Nose normal.   Eyes:      Extraocular Movements: Extraocular " "movements intact.      Pupils: Pupils are equal, round, and reactive to light.   Cardiovascular:      Rate and Rhythm: Normal rate and regular rhythm.   Pulmonary:      Breath sounds: Normal breath sounds.   Abdominal:      General: Abdomen is flat. Bowel sounds are normal.      Palpations: Abdomen is soft.   Musculoskeletal:      Right lower leg: No edema.      Left lower leg: No edema.   Neurological:      Mental Status: She is alert.     /52   Pulse 67   Ht 1.626 m (5' 4\")   Wt 80.7 kg (178 lb)   SpO2 99%   BMI 30.55 kg/m²       Assessment/Plan   Problem List Items Addressed This Visit       Chronic kidney disease with active medical management without dialysis, stage 4 (severe) (CMS/[facility])     Patient sees her nephrologist tomorrow.  Otherwise her renal function is at stage IV I will check another A1c and urine microalbumin for her diabetes in February.         Controlled diabetes mellitus with chronic kidney disease (CMS/[facility])    Relevant Medications    OneTouch Ultra Test strip    Other Relevant Orders    Albumin , Urine Random    CBC    Comprehensive Metabolic Panel    Hemoglobin A1C    Lipid Panel    Thyroid Stimulating Hormone    Vitamin D 25-Hydroxy,Total (for eval of Vitamin D levels)    Type 2 diabetes mellitus (CMS/[facility])     Patient's diabetes is stable with an A1c of 7%.  She is encouraged to work on her diet and watch the sugar cookies and [employer] treats and we will check another A1c before her next appointment which will be the end of February early March.         Relevant Medications    atorvastatin (Lipitor) 10 mg tablet    Other Relevant Orders    Albumin , Urine Random    CBC    Comprehensive Metabolic Panel    Hemoglobin A1C    Lipid Panel    Thyroid Stimulating Hormone    Vitamin D 25-Hydroxy,Total (for eval of Vitamin D levels)    Hypercholesterolemia     Cholesterol is stable on atorvastatin 10 mg daily.  Annual blood work will be done in February and she will follow-up after that " appointment.         Relevant Medications    atorvastatin (Lipitor) 10 mg tablet    Other Relevant Orders    Albumin , Urine Random    CBC    Comprehensive Metabolic Panel    Hemoglobin A1C    Lipid Panel    Thyroid Stimulating Hormone    Vitamin D 25-Hydroxy,Total (for eval of Vitamin D levels)    Hypertension - Primary     Blood pressure stable and well-controlled and she was given a refill on her amlodipine         Relevant Medications    amLODIPine (Norvasc) 10 mg tablet    Other Relevant Orders    Albumin , Urine Random    CBC    Comprehensive Metabolic Panel    Hemoglobin A1C    Lipid Panel    Thyroid Stimulating Hormone    Vitamin D 25-Hydroxy,Total (for eval of Vitamin D levels)    Renal insufficiency    Relevant Orders    Albumin , Urine Random    CBC    Comprehensive Metabolic Panel    Hemoglobin A1C    Lipid Panel    Thyroid Stimulating Hormone    Vitamin D 25-Hydroxy,Total (for eval of Vitamin D levels)              It has been a pleasure seeing you.  Lucita Vance MD

## 2023-12-12 NOTE — ASSESSMENT & PLAN NOTE
Cholesterol is stable on atorvastatin 10 mg daily.  Annual blood work will be done in February and she will follow-up after that appointment.

## 2023-12-12 NOTE — ASSESSMENT & PLAN NOTE
>>ASSESSMENT AND PLAN FOR TYPE 2 DIABETES MELLITUS (CMS/[facility]) WRITTEN ON 12/12/2023  2:39 PM BY BRUCE KRAFT MD    Patient's diabetes is stable with an A1c of 7%.  She is encouraged to work on her diet and watch the sugar cookies and [place] treats and we will check another A1c before her next appointment which will be the end of February early March.

## 2023-12-12 NOTE — PATIENT INSTRUCTIONS
Please get the RSV vaccine called Arexvy    Follow up Dr Vance after 2/23/24  for 45 min wellness exam    Get fasting labs after 2/23/24

## 2023-12-13 ENCOUNTER — OFFICE VISIT (OUTPATIENT)
Dept: NEPHROLOGY | Facility: CLINIC | Age: 83
End: 2023-12-13
Payer: MEDICARE

## 2023-12-13 VITALS
SYSTOLIC BLOOD PRESSURE: 116 MMHG | HEIGHT: 65 IN | DIASTOLIC BLOOD PRESSURE: 58 MMHG | WEIGHT: 179.8 LBS | HEART RATE: 67 BPM | BODY MASS INDEX: 29.96 KG/M2

## 2023-12-13 DIAGNOSIS — I10 PRIMARY HYPERTENSION: ICD-10-CM

## 2023-12-13 DIAGNOSIS — N18.4 CHRONIC KIDNEY DISEASE WITH ACTIVE MEDICAL MANAGEMENT WITHOUT DIALYSIS, STAGE 4 (SEVERE) (MULTI): ICD-10-CM

## 2023-12-13 DIAGNOSIS — E78.00 HYPERCHOLESTEROLEMIA: Primary | ICD-10-CM

## 2023-12-13 DIAGNOSIS — E11.22 TYPE 2 DIABETES MELLITUS WITH STAGE 4 CHRONIC KIDNEY DISEASE, WITHOUT LONG-TERM CURRENT USE OF INSULIN (MULTI): ICD-10-CM

## 2023-12-13 DIAGNOSIS — N18.4 TYPE 2 DIABETES MELLITUS WITH STAGE 4 CHRONIC KIDNEY DISEASE, WITHOUT LONG-TERM CURRENT USE OF INSULIN (MULTI): ICD-10-CM

## 2023-12-13 PROCEDURE — 1159F MED LIST DOCD IN RCRD: CPT | Performed by: CLINICAL NURSE SPECIALIST

## 2023-12-13 PROCEDURE — 1160F RVW MEDS BY RX/DR IN RCRD: CPT | Performed by: CLINICAL NURSE SPECIALIST

## 2023-12-13 PROCEDURE — 1126F AMNT PAIN NOTED NONE PRSNT: CPT | Performed by: CLINICAL NURSE SPECIALIST

## 2023-12-13 PROCEDURE — 3078F DIAST BP <80 MM HG: CPT | Performed by: CLINICAL NURSE SPECIALIST

## 2023-12-13 PROCEDURE — 99213 OFFICE O/P EST LOW 20 MIN: CPT | Performed by: CLINICAL NURSE SPECIALIST

## 2023-12-13 PROCEDURE — 1036F TOBACCO NON-USER: CPT | Performed by: CLINICAL NURSE SPECIALIST

## 2023-12-13 PROCEDURE — 3074F SYST BP LT 130 MM HG: CPT | Performed by: CLINICAL NURSE SPECIALIST

## 2023-12-13 RX ORDER — SITAGLIPTIN 100 MG/1
100 TABLET, FILM COATED ORAL DAILY
Qty: 90 TABLET | Refills: 3 | Status: SHIPPED | OUTPATIENT
Start: 2023-12-13 | End: 2024-01-11 | Stop reason: SDUPTHER

## 2023-12-13 RX ORDER — DOXAZOSIN 2 MG/1
2 TABLET ORAL DAILY
Qty: 90 TABLET | Refills: 3 | Status: CANCELLED | OUTPATIENT
Start: 2023-12-13

## 2023-12-13 RX ORDER — INSULIN GLARGINE 100 [IU]/ML
15 INJECTION, SOLUTION SUBCUTANEOUS NIGHTLY
Qty: 15 ML | Refills: 3 | Status: SHIPPED | OUTPATIENT
Start: 2023-12-13 | End: 2024-01-18 | Stop reason: SDUPTHER

## 2023-12-13 ASSESSMENT — ENCOUNTER SYMPTOMS
CONSTITUTIONAL NEGATIVE: 1
CARDIOVASCULAR NEGATIVE: 1
GASTROINTESTINAL NEGATIVE: 1
RESPIRATORY NEGATIVE: 1
ENDOCRINE NEGATIVE: 1
MUSCULOSKELETAL NEGATIVE: 1
NEUROLOGICAL NEGATIVE: 1
PSYCHIATRIC NEGATIVE: 1

## 2023-12-13 NOTE — ASSESSMENT & PLAN NOTE
Blood pressure is currently well-controlled and is slightly on the lower side today, she is having some dizziness in the morning, will discontinue doxazosin 2 mg, it is a small dose and may be causing some of her dizziness, will continue on metoprolol, lisinopril, amlodipine

## 2023-12-13 NOTE — PROGRESS NOTES
Subjective   Patient ID: Hyacinth Crisostomo is a 83 y.o. female who presents for Follow-up (6 month ck/Review labs).  Patient being seen in follow-up for chronic kidney disease stage IV with history of diabetes and hypertension    BMP with glucose 138  Sodium 142, potassium 5.1, chloride 111, bicarb 21  Calcium 9.4  Renal functions BUN of 48 and creatinine of 1.91  Hemoglobin A1c 7    She is having some dizziness in the morning when she first gets out of bed  She is not routinely checking her blood pressure  Her blood sugars have been fairly well-controlled however her hemoglobin A1c went up slightly  She does not have any edema  She is voiding without difficulties          Review of Systems   Constitutional: Negative.    Respiratory: Negative.     Cardiovascular: Negative.    Gastrointestinal: Negative.    Endocrine: Negative.    Genitourinary: Negative.    Musculoskeletal: Negative.    Skin: Negative.    Neurological: Negative.    Psychiatric/Behavioral: Negative.         Objective   Physical Exam  Vitals reviewed.   Constitutional:       Appearance: Normal appearance.   HENT:      Head: Normocephalic.   Cardiovascular:      Rate and Rhythm: Normal rate and regular rhythm.   Pulmonary:      Effort: Pulmonary effort is normal.      Breath sounds: Normal breath sounds.   Abdominal:      Palpations: Abdomen is soft.   Musculoskeletal:         General: Normal range of motion.   Skin:     General: Skin is warm and dry.   Neurological:      Mental Status: She is alert and oriented to person, place, and time.   Psychiatric:         Mood and Affect: Mood normal.         Behavior: Behavior normal.         Assessment/Plan   Problem List Items Addressed This Visit             ICD-10-CM    Chronic kidney disease with active medical management without dialysis, stage 4 (severe) (CMS/[facility]) N18.4    Relevant Medications    Januvia 100 mg tablet    Lantus Solostar U-100 Insulin 100 unit/mL (3 mL) pen    Other Relevant Orders     "Follow Up In Nephrology    Type 2 diabetes mellitus (CMS/[facility]) E11.9    Hypercholesterolemia - Primary E78.00     Lab Results   Component Value Date    CHOL 105 01/16/2023    CHOL 140 01/14/2022    CHOL 133 02/24/2021     Lab Results   Component Value Date    HDL 30.5 (A) 01/16/2023    HDL 31.1 (A) 01/14/2022    HDL 25.3 (A) 02/24/2021   No results found for: \"LDLCALC\"  Lab Results   Component Value Date    TRIG 108 01/16/2023    TRIG 150 (H) 01/14/2022    TRIG 142 02/24/2021   No components found for: \"CHOLHDL\"  Continue atorvastatin 10 mg         Hypertension I10     Blood pressure is currently well-controlled and is slightly on the lower side today, she is having some dizziness in the morning, will discontinue doxazosin 2 mg, it is a small dose and may be causing some of her dizziness, will continue on metoprolol, lisinopril, amlodipine          CKD IV with baseline creatinine about 1.6-1.9  Diabetes mellitus type 2 for 40 years  Hypertension  Glaucoma  Left vision loss  Dyslipidemia  Slight hyperkalemia  Vitamin D deficiency   Peripheral edema: Slight        Andreina Clemens, SEN-KEMAR, DNP 12/13/23 1:48 PM   "

## 2023-12-13 NOTE — ASSESSMENT & PLAN NOTE
Creatinine is stable at 1.91 and essentially unchanged from her last check, I did talk with her about starting an SGLT2, we have discussed this in the past, she really does not want to take any extra medications, she will think about it and let me know what she would like to do I did explain to her that it is renal protective along with the side effects of increased chance for UTI with some dehydration, at this time her function is stable and will continue on her current regimen

## 2023-12-13 NOTE — ASSESSMENT & PLAN NOTE
"Lab Results   Component Value Date    CHOL 105 01/16/2023    CHOL 140 01/14/2022    CHOL 133 02/24/2021     Lab Results   Component Value Date    HDL 30.5 (A) 01/16/2023    HDL 31.1 (A) 01/14/2022    HDL 25.3 (A) 02/24/2021     No results found for: \"LDLCALC\"  Lab Results   Component Value Date    TRIG 108 01/16/2023    TRIG 150 (H) 01/14/2022    TRIG 142 02/24/2021     No components found for: \"CHOLHDL\"  Continue atorvastatin 10 mg  " Start taking citrucel fiber daily. Start with once a day and then increase over several days to 2-3 times a day.     Start using hydrocortisone foam (proctofoam) for rectal pain/irritation.     Follow up in 4 weeks.

## 2024-01-02 ENCOUNTER — LAB (OUTPATIENT)
Dept: LAB | Facility: LAB | Age: 84
End: 2024-01-02
Payer: MEDICARE

## 2024-01-02 ENCOUNTER — OFFICE (OUTPATIENT)
Dept: URBAN - METROPOLITAN AREA CLINIC 26 | Facility: CLINIC | Age: 84
End: 2024-01-02
Payer: MEDICARE

## 2024-01-02 VITALS
DIASTOLIC BLOOD PRESSURE: 76 MMHG | WEIGHT: 180 LBS | HEIGHT: 63 IN | HEART RATE: 76 BPM | SYSTOLIC BLOOD PRESSURE: 168 MMHG | TEMPERATURE: 97.7 F

## 2024-01-02 DIAGNOSIS — Z86.010 PERSONAL HISTORY OF COLONIC POLYPS: ICD-10-CM

## 2024-01-02 DIAGNOSIS — Z79.4 LONG TERM (CURRENT) USE OF INSULIN: ICD-10-CM

## 2024-01-02 DIAGNOSIS — D64.9 ANEMIA, UNSPECIFIED: ICD-10-CM

## 2024-01-02 DIAGNOSIS — Z79.84 LONG TERM (CURRENT) USE OF ORAL HYPOGLYCEMIC DRUGS: ICD-10-CM

## 2024-01-02 DIAGNOSIS — Z86.010 PERSONAL HISTORY OF COLONIC POLYPS: Primary | ICD-10-CM

## 2024-01-02 PROCEDURE — 85025 COMPLETE CBC W/AUTO DIFF WBC: CPT

## 2024-01-02 PROCEDURE — 36415 COLL VENOUS BLD VENIPUNCTURE: CPT

## 2024-01-02 PROCEDURE — 99213 OFFICE O/P EST LOW 20 MIN: CPT | Performed by: NURSE PRACTITIONER

## 2024-01-02 PROCEDURE — 80053 COMPREHEN METABOLIC PANEL: CPT

## 2024-01-02 RX ORDER — ONDANSETRON HYDROCHLORIDE 4 MG/1
TABLET, FILM COATED ORAL
Qty: 3 | Refills: 0 | Status: COMPLETED
Start: 2024-01-02 | End: 2024-01-10

## 2024-01-02 RX ORDER — POLYETHYLENE GLYCOL 3350, SODIUM SULFATE ANHYDROUS, SODIUM BICARBONATE, SODIUM CHLORIDE, POTASSIUM CHLORIDE 236; 22.74; 6.74; 5.86; 2.97 G/4L; G/4L; G/4L; G/4L; G/4L
POWDER, FOR SOLUTION ORAL
Qty: 4000 | Refills: 0 | Status: COMPLETED
Start: 2024-01-02 | End: 2024-01-10

## 2024-01-03 VITALS
HEART RATE: 69 BPM | RESPIRATION RATE: 30 BRPM | HEART RATE: 72 BPM | SYSTOLIC BLOOD PRESSURE: 157 MMHG | SYSTOLIC BLOOD PRESSURE: 145 MMHG | HEART RATE: 69 BPM | DIASTOLIC BLOOD PRESSURE: 55 MMHG | DIASTOLIC BLOOD PRESSURE: 58 MMHG | SYSTOLIC BLOOD PRESSURE: 138 MMHG | SYSTOLIC BLOOD PRESSURE: 145 MMHG | OXYGEN SATURATION: 95 % | TEMPERATURE: 97.3 F | DIASTOLIC BLOOD PRESSURE: 52 MMHG | RESPIRATION RATE: 14 BRPM | SYSTOLIC BLOOD PRESSURE: 128 MMHG | SYSTOLIC BLOOD PRESSURE: 134 MMHG | SYSTOLIC BLOOD PRESSURE: 128 MMHG | OXYGEN SATURATION: 97 % | HEART RATE: 88 BPM | DIASTOLIC BLOOD PRESSURE: 58 MMHG | SYSTOLIC BLOOD PRESSURE: 144 MMHG | DIASTOLIC BLOOD PRESSURE: 62 MMHG | HEART RATE: 75 BPM | RESPIRATION RATE: 40 BRPM | HEART RATE: 88 BPM | DIASTOLIC BLOOD PRESSURE: 58 MMHG | OXYGEN SATURATION: 92 % | RESPIRATION RATE: 11 BRPM | RESPIRATION RATE: 24 BRPM | DIASTOLIC BLOOD PRESSURE: 60 MMHG | SYSTOLIC BLOOD PRESSURE: 153 MMHG | RESPIRATION RATE: 12 BRPM | OXYGEN SATURATION: 95 % | DIASTOLIC BLOOD PRESSURE: 50 MMHG | HEART RATE: 81 BPM | RESPIRATION RATE: 23 BRPM | SYSTOLIC BLOOD PRESSURE: 138 MMHG | SYSTOLIC BLOOD PRESSURE: 144 MMHG | RESPIRATION RATE: 31 BRPM | WEIGHT: 178 LBS | OXYGEN SATURATION: 87 % | DIASTOLIC BLOOD PRESSURE: 61 MMHG | SYSTOLIC BLOOD PRESSURE: 148 MMHG | RESPIRATION RATE: 28 BRPM | HEART RATE: 82 BPM | HEART RATE: 80 BPM | HEART RATE: 81 BPM | RESPIRATION RATE: 11 BRPM | RESPIRATION RATE: 14 BRPM | HEIGHT: 63 IN | OXYGEN SATURATION: 97 % | RESPIRATION RATE: 19 BRPM | RESPIRATION RATE: 19 BRPM | DIASTOLIC BLOOD PRESSURE: 62 MMHG | SYSTOLIC BLOOD PRESSURE: 114 MMHG | SYSTOLIC BLOOD PRESSURE: 148 MMHG | RESPIRATION RATE: 17 BRPM | RESPIRATION RATE: 21 BRPM | RESPIRATION RATE: 28 BRPM | RESPIRATION RATE: 14 BRPM | RESPIRATION RATE: 5 BRPM | HEART RATE: 94 BPM | HEART RATE: 81 BPM | RESPIRATION RATE: 30 BRPM | RESPIRATION RATE: 17 BRPM | TEMPERATURE: 97.3 F | DIASTOLIC BLOOD PRESSURE: 60 MMHG | HEART RATE: 69 BPM | RESPIRATION RATE: 30 BRPM | WEIGHT: 178 LBS | HEART RATE: 95 BPM | SYSTOLIC BLOOD PRESSURE: 157 MMHG | DIASTOLIC BLOOD PRESSURE: 53 MMHG | RESPIRATION RATE: 24 BRPM | HEART RATE: 66 BPM | HEART RATE: 95 BPM | RESPIRATION RATE: 21 BRPM | RESPIRATION RATE: 12 BRPM | HEART RATE: 80 BPM | RESPIRATION RATE: 23 BRPM | RESPIRATION RATE: 40 BRPM | RESPIRATION RATE: 24 BRPM | RESPIRATION RATE: 27 BRPM | RESPIRATION RATE: 12 BRPM | SYSTOLIC BLOOD PRESSURE: 134 MMHG | DIASTOLIC BLOOD PRESSURE: 52 MMHG | WEIGHT: 178 LBS | HEIGHT: 63 IN | DIASTOLIC BLOOD PRESSURE: 55 MMHG | RESPIRATION RATE: 28 BRPM | DIASTOLIC BLOOD PRESSURE: 64 MMHG | SYSTOLIC BLOOD PRESSURE: 134 MMHG | OXYGEN SATURATION: 98 % | OXYGEN SATURATION: 95 % | OXYGEN SATURATION: 87 % | HEART RATE: 78 BPM | SYSTOLIC BLOOD PRESSURE: 132 MMHG | RESPIRATION RATE: 27 BRPM | SYSTOLIC BLOOD PRESSURE: 144 MMHG | HEART RATE: 82 BPM | OXYGEN SATURATION: 92 % | RESPIRATION RATE: 17 BRPM | SYSTOLIC BLOOD PRESSURE: 153 MMHG | HEART RATE: 72 BPM | RESPIRATION RATE: 23 BRPM | SYSTOLIC BLOOD PRESSURE: 139 MMHG | DIASTOLIC BLOOD PRESSURE: 53 MMHG | DIASTOLIC BLOOD PRESSURE: 53 MMHG | RESPIRATION RATE: 31 BRPM | SYSTOLIC BLOOD PRESSURE: 138 MMHG | DIASTOLIC BLOOD PRESSURE: 64 MMHG | DIASTOLIC BLOOD PRESSURE: 61 MMHG | SYSTOLIC BLOOD PRESSURE: 132 MMHG | RESPIRATION RATE: 11 BRPM | HEART RATE: 78 BPM | HEART RATE: 66 BPM | HEART RATE: 75 BPM | RESPIRATION RATE: 27 BRPM | DIASTOLIC BLOOD PRESSURE: 52 MMHG | SYSTOLIC BLOOD PRESSURE: 139 MMHG | OXYGEN SATURATION: 98 % | RESPIRATION RATE: 31 BRPM | SYSTOLIC BLOOD PRESSURE: 145 MMHG | SYSTOLIC BLOOD PRESSURE: 132 MMHG | DIASTOLIC BLOOD PRESSURE: 55 MMHG | HEART RATE: 82 BPM | DIASTOLIC BLOOD PRESSURE: 62 MMHG | DIASTOLIC BLOOD PRESSURE: 64 MMHG | HEART RATE: 72 BPM | SYSTOLIC BLOOD PRESSURE: 122 MMHG | SYSTOLIC BLOOD PRESSURE: 122 MMHG | RESPIRATION RATE: 19 BRPM | OXYGEN SATURATION: 98 % | HEIGHT: 63 IN | HEART RATE: 94 BPM | RESPIRATION RATE: 5 BRPM | TEMPERATURE: 97.3 F | SYSTOLIC BLOOD PRESSURE: 114 MMHG | HEART RATE: 94 BPM | SYSTOLIC BLOOD PRESSURE: 122 MMHG | HEART RATE: 75 BPM | DIASTOLIC BLOOD PRESSURE: 50 MMHG | SYSTOLIC BLOOD PRESSURE: 148 MMHG | OXYGEN SATURATION: 87 % | RESPIRATION RATE: 21 BRPM | OXYGEN SATURATION: 92 % | SYSTOLIC BLOOD PRESSURE: 139 MMHG | DIASTOLIC BLOOD PRESSURE: 50 MMHG | HEART RATE: 80 BPM | HEART RATE: 88 BPM | HEART RATE: 95 BPM | SYSTOLIC BLOOD PRESSURE: 114 MMHG | RESPIRATION RATE: 5 BRPM | SYSTOLIC BLOOD PRESSURE: 157 MMHG | HEART RATE: 66 BPM | DIASTOLIC BLOOD PRESSURE: 60 MMHG | DIASTOLIC BLOOD PRESSURE: 61 MMHG | RESPIRATION RATE: 40 BRPM | HEART RATE: 78 BPM | SYSTOLIC BLOOD PRESSURE: 153 MMHG | OXYGEN SATURATION: 97 % | SYSTOLIC BLOOD PRESSURE: 128 MMHG

## 2024-01-03 LAB
ALBUMIN SERPL BCP-MCNC: 4.1 G/DL (ref 3.4–5)
ALP SERPL-CCNC: 71 U/L (ref 33–136)
ALT SERPL W P-5'-P-CCNC: 26 U/L (ref 7–45)
ANION GAP SERPL CALC-SCNC: 13 MMOL/L (ref 10–20)
AST SERPL W P-5'-P-CCNC: 21 U/L (ref 9–39)
BASOPHILS # BLD AUTO: 0.05 X10*3/UL (ref 0–0.1)
BASOPHILS NFR BLD AUTO: 0.5 %
BILIRUB SERPL-MCNC: 0.3 MG/DL (ref 0–1.2)
BUN SERPL-MCNC: 34 MG/DL (ref 6–23)
CALCIUM SERPL-MCNC: 9.9 MG/DL (ref 8.6–10.6)
CHLORIDE SERPL-SCNC: 108 MMOL/L (ref 98–107)
CO2 SERPL-SCNC: 24 MMOL/L (ref 21–32)
CREAT SERPL-MCNC: 1.55 MG/DL (ref 0.5–1.05)
EOSINOPHIL # BLD AUTO: 0.38 X10*3/UL (ref 0–0.4)
EOSINOPHIL NFR BLD AUTO: 3.7 %
ERYTHROCYTE [DISTWIDTH] IN BLOOD BY AUTOMATED COUNT: 11.9 % (ref 11.5–14.5)
GFR SERPL CREATININE-BSD FRML MDRD: 33 ML/MIN/1.73M*2
GLUCOSE SERPL-MCNC: 166 MG/DL (ref 74–99)
HCT VFR BLD AUTO: 39 % (ref 36–46)
HGB BLD-MCNC: 12.3 G/DL (ref 12–16)
IMM GRANULOCYTES # BLD AUTO: 0.03 X10*3/UL (ref 0–0.5)
IMM GRANULOCYTES NFR BLD AUTO: 0.3 % (ref 0–0.9)
LYMPHOCYTES # BLD AUTO: 1.39 X10*3/UL (ref 0.8–3)
LYMPHOCYTES NFR BLD AUTO: 13.6 %
MCH RBC QN AUTO: 30.2 PG (ref 26–34)
MCHC RBC AUTO-ENTMCNC: 31.5 G/DL (ref 32–36)
MCV RBC AUTO: 96 FL (ref 80–100)
MONOCYTES # BLD AUTO: 1.01 X10*3/UL (ref 0.05–0.8)
MONOCYTES NFR BLD AUTO: 9.9 %
NEUTROPHILS # BLD AUTO: 7.36 X10*3/UL (ref 1.6–5.5)
NEUTROPHILS NFR BLD AUTO: 72 %
NRBC BLD-RTO: 0 /100 WBCS (ref 0–0)
PLATELET # BLD AUTO: 221 X10*3/UL (ref 150–450)
POTASSIUM SERPL-SCNC: 5.4 MMOL/L (ref 3.5–5.3)
PROT SERPL-MCNC: 7.1 G/DL (ref 6.4–8.2)
RBC # BLD AUTO: 4.07 X10*6/UL (ref 4–5.2)
SODIUM SERPL-SCNC: 140 MMOL/L (ref 136–145)
WBC # BLD AUTO: 10.2 X10*3/UL (ref 4.4–11.3)

## 2024-01-10 ENCOUNTER — OFFICE (OUTPATIENT)
Dept: URBAN - METROPOLITAN AREA PATHOLOGY 2 | Facility: PATHOLOGY | Age: 84
End: 2024-01-10
Payer: MEDICARE

## 2024-01-10 ENCOUNTER — AMBULATORY SURGICAL CENTER (OUTPATIENT)
Dept: URBAN - METROPOLITAN AREA SURGERY 12 | Facility: SURGERY | Age: 84
End: 2024-01-10
Payer: MEDICARE

## 2024-01-10 DIAGNOSIS — Z09 ENCOUNTER FOR FOLLOW-UP EXAMINATION AFTER COMPLETED TREATMEN: ICD-10-CM

## 2024-01-10 DIAGNOSIS — K62.1 RECTAL POLYP: ICD-10-CM

## 2024-01-10 DIAGNOSIS — Z86.010 PERSONAL HISTORY OF COLONIC POLYPS: ICD-10-CM

## 2024-01-10 DIAGNOSIS — K64.4 RESIDUAL HEMORRHOIDAL SKIN TAGS: ICD-10-CM

## 2024-01-10 DIAGNOSIS — K57.30 DIVERTICULOSIS OF LARGE INTESTINE WITHOUT PERFORATION OR ABS: ICD-10-CM

## 2024-01-10 DIAGNOSIS — K64.8 OTHER HEMORRHOIDS: ICD-10-CM

## 2024-01-10 PROCEDURE — 45384 COLONOSCOPY W/LESION REMOVAL: CPT | Performed by: INTERNAL MEDICINE

## 2024-01-10 PROCEDURE — 88305 TISSUE EXAM BY PATHOLOGIST: CPT | Mod: TC | Performed by: PATHOLOGY

## 2024-01-11 DIAGNOSIS — N18.4 CHRONIC KIDNEY DISEASE WITH ACTIVE MEDICAL MANAGEMENT WITHOUT DIALYSIS, STAGE 4 (SEVERE) (MULTI): ICD-10-CM

## 2024-01-11 RX ORDER — SITAGLIPTIN 100 MG/1
100 TABLET, FILM COATED ORAL DAILY
Qty: 90 TABLET | Refills: 3 | Status: SHIPPED | OUTPATIENT
Start: 2024-01-11 | End: 2024-01-18 | Stop reason: SDUPTHER

## 2024-01-18 DIAGNOSIS — E11.9 TYPE 2 DIABETES MELLITUS WITHOUT COMPLICATIONS (MULTI): ICD-10-CM

## 2024-01-18 DIAGNOSIS — N18.4 CHRONIC KIDNEY DISEASE WITH ACTIVE MEDICAL MANAGEMENT WITHOUT DIALYSIS, STAGE 4 (SEVERE) (MULTI): ICD-10-CM

## 2024-01-18 RX ORDER — PEN NEEDLE, DIABETIC 31 GX5/16"
NEEDLE, DISPOSABLE MISCELLANEOUS
Qty: 2 EACH | Refills: 3 | Status: SHIPPED | OUTPATIENT
Start: 2024-01-18

## 2024-01-18 RX ORDER — INSULIN GLARGINE 100 [IU]/ML
15 INJECTION, SOLUTION SUBCUTANEOUS NIGHTLY
Qty: 15 ML | Refills: 3 | Status: SHIPPED | OUTPATIENT
Start: 2024-01-18

## 2024-02-23 ENCOUNTER — LAB (OUTPATIENT)
Dept: LAB | Facility: LAB | Age: 84
End: 2024-02-23
Payer: MEDICARE

## 2024-02-23 DIAGNOSIS — E11.9 TYPE 2 DIABETES MELLITUS WITHOUT COMPLICATION, WITHOUT LONG-TERM CURRENT USE OF INSULIN (MULTI): ICD-10-CM

## 2024-02-23 DIAGNOSIS — I10 PRIMARY HYPERTENSION: ICD-10-CM

## 2024-02-23 DIAGNOSIS — N28.9 RENAL INSUFFICIENCY: ICD-10-CM

## 2024-02-23 DIAGNOSIS — E11.22 CONTROLLED TYPE 2 DIABETES MELLITUS WITH STAGE 4 CHRONIC KIDNEY DISEASE, WITHOUT LONG-TERM CURRENT USE OF INSULIN (MULTI): ICD-10-CM

## 2024-02-23 DIAGNOSIS — E78.00 HYPERCHOLESTEROLEMIA: ICD-10-CM

## 2024-02-23 DIAGNOSIS — N18.4 CONTROLLED TYPE 2 DIABETES MELLITUS WITH STAGE 4 CHRONIC KIDNEY DISEASE, WITHOUT LONG-TERM CURRENT USE OF INSULIN (MULTI): ICD-10-CM

## 2024-02-23 PROCEDURE — 80061 LIPID PANEL: CPT

## 2024-02-23 PROCEDURE — 36415 COLL VENOUS BLD VENIPUNCTURE: CPT

## 2024-02-23 PROCEDURE — 84443 ASSAY THYROID STIM HORMONE: CPT

## 2024-02-23 PROCEDURE — 82570 ASSAY OF URINE CREATININE: CPT

## 2024-02-23 PROCEDURE — 82043 UR ALBUMIN QUANTITATIVE: CPT

## 2024-02-23 PROCEDURE — 85027 COMPLETE CBC AUTOMATED: CPT

## 2024-02-23 PROCEDURE — 83036 HEMOGLOBIN GLYCOSYLATED A1C: CPT

## 2024-02-23 PROCEDURE — 82306 VITAMIN D 25 HYDROXY: CPT

## 2024-02-23 PROCEDURE — 80053 COMPREHEN METABOLIC PANEL: CPT

## 2024-02-24 LAB
25(OH)D3 SERPL-MCNC: 39 NG/ML (ref 30–100)
ALBUMIN SERPL BCP-MCNC: 4 G/DL (ref 3.4–5)
ALP SERPL-CCNC: 67 U/L (ref 33–136)
ALT SERPL W P-5'-P-CCNC: 14 U/L (ref 7–45)
ANION GAP SERPL CALC-SCNC: 13 MMOL/L (ref 10–20)
AST SERPL W P-5'-P-CCNC: 15 U/L (ref 9–39)
BILIRUB SERPL-MCNC: 0.4 MG/DL (ref 0–1.2)
BUN SERPL-MCNC: 40 MG/DL (ref 6–23)
CALCIUM SERPL-MCNC: 9.8 MG/DL (ref 8.6–10.6)
CHLORIDE SERPL-SCNC: 110 MMOL/L (ref 98–107)
CHOLEST SERPL-MCNC: 117 MG/DL (ref 0–199)
CHOLESTEROL/HDL RATIO: 3.7
CO2 SERPL-SCNC: 25 MMOL/L (ref 21–32)
CREAT SERPL-MCNC: 1.59 MG/DL (ref 0.5–1.05)
CREAT UR-MCNC: 56 MG/DL (ref 20–320)
EGFRCR SERPLBLD CKD-EPI 2021: 32 ML/MIN/1.73M*2
ERYTHROCYTE [DISTWIDTH] IN BLOOD BY AUTOMATED COUNT: 11.7 % (ref 11.5–14.5)
EST. AVERAGE GLUCOSE BLD GHB EST-MCNC: 157 MG/DL
GLUCOSE SERPL-MCNC: 113 MG/DL (ref 74–99)
HBA1C MFR BLD: 7.1 %
HCT VFR BLD AUTO: 37.5 % (ref 36–46)
HDLC SERPL-MCNC: 31.5 MG/DL
HGB BLD-MCNC: 12 G/DL (ref 12–16)
LDLC SERPL CALC-MCNC: 64 MG/DL
MCH RBC QN AUTO: 30.2 PG (ref 26–34)
MCHC RBC AUTO-ENTMCNC: 32 G/DL (ref 32–36)
MCV RBC AUTO: 95 FL (ref 80–100)
MICROALBUMIN UR-MCNC: 40.6 MG/L
MICROALBUMIN/CREAT UR: 72.5 UG/MG CREAT
NON HDL CHOLESTEROL: 86 MG/DL (ref 0–149)
NRBC BLD-RTO: 0 /100 WBCS (ref 0–0)
PLATELET # BLD AUTO: 243 X10*3/UL (ref 150–450)
POTASSIUM SERPL-SCNC: 5.1 MMOL/L (ref 3.5–5.3)
PROT SERPL-MCNC: 7.1 G/DL (ref 6.4–8.2)
RBC # BLD AUTO: 3.97 X10*6/UL (ref 4–5.2)
SODIUM SERPL-SCNC: 143 MMOL/L (ref 136–145)
TRIGL SERPL-MCNC: 107 MG/DL (ref 0–149)
TSH SERPL-ACNC: 2 MIU/L (ref 0.44–3.98)
VLDL: 21 MG/DL (ref 0–40)
WBC # BLD AUTO: 10 X10*3/UL (ref 4.4–11.3)

## 2024-02-29 ENCOUNTER — OFFICE VISIT (OUTPATIENT)
Dept: PRIMARY CARE | Facility: CLINIC | Age: 84
End: 2024-02-29
Payer: MEDICARE

## 2024-02-29 VITALS
HEART RATE: 71 BPM | HEIGHT: 65 IN | SYSTOLIC BLOOD PRESSURE: 128 MMHG | WEIGHT: 175.4 LBS | OXYGEN SATURATION: 98 % | BODY MASS INDEX: 29.22 KG/M2 | DIASTOLIC BLOOD PRESSURE: 62 MMHG

## 2024-02-29 DIAGNOSIS — N18.4 CONTROLLED TYPE 2 DIABETES MELLITUS WITH STAGE 4 CHRONIC KIDNEY DISEASE, WITHOUT LONG-TERM CURRENT USE OF INSULIN (MULTI): ICD-10-CM

## 2024-02-29 DIAGNOSIS — M81.0 AGE-RELATED OSTEOPOROSIS WITHOUT CURRENT PATHOLOGICAL FRACTURE: ICD-10-CM

## 2024-02-29 DIAGNOSIS — N18.4 CHRONIC KIDNEY DISEASE WITH ACTIVE MEDICAL MANAGEMENT WITHOUT DIALYSIS, STAGE 4 (SEVERE) (MULTI): ICD-10-CM

## 2024-02-29 DIAGNOSIS — E11.9 TYPE 2 DIABETES MELLITUS WITHOUT COMPLICATION, WITHOUT LONG-TERM CURRENT USE OF INSULIN (MULTI): ICD-10-CM

## 2024-02-29 DIAGNOSIS — E11.22 CONTROLLED TYPE 2 DIABETES MELLITUS WITH STAGE 4 CHRONIC KIDNEY DISEASE, WITHOUT LONG-TERM CURRENT USE OF INSULIN (MULTI): ICD-10-CM

## 2024-02-29 DIAGNOSIS — Z00.00 ROUTINE GENERAL MEDICAL EXAMINATION AT HEALTH CARE FACILITY: Primary | ICD-10-CM

## 2024-02-29 DIAGNOSIS — Z66 DNR (DO NOT RESUSCITATE): ICD-10-CM

## 2024-02-29 DIAGNOSIS — Z00.00 WELLNESS EXAMINATION: ICD-10-CM

## 2024-02-29 DIAGNOSIS — Z13.39 ALCOHOL SCREENING: ICD-10-CM

## 2024-02-29 DIAGNOSIS — Z13.89 SCREENING FOR MULTIPLE CONDITIONS: ICD-10-CM

## 2024-02-29 DIAGNOSIS — E78.00 HYPERCHOLESTEROLEMIA: ICD-10-CM

## 2024-02-29 DIAGNOSIS — I10 PRIMARY HYPERTENSION: ICD-10-CM

## 2024-02-29 PROCEDURE — G0442 ANNUAL ALCOHOL SCREEN 15 MIN: HCPCS | Performed by: INTERNAL MEDICINE

## 2024-02-29 PROCEDURE — 1126F AMNT PAIN NOTED NONE PRSNT: CPT | Performed by: INTERNAL MEDICINE

## 2024-02-29 PROCEDURE — 1160F RVW MEDS BY RX/DR IN RCRD: CPT | Performed by: INTERNAL MEDICINE

## 2024-02-29 PROCEDURE — G0439 PPPS, SUBSEQ VISIT: HCPCS | Performed by: INTERNAL MEDICINE

## 2024-02-29 PROCEDURE — 1159F MED LIST DOCD IN RCRD: CPT | Performed by: INTERNAL MEDICINE

## 2024-02-29 PROCEDURE — 1170F FXNL STATUS ASSESSED: CPT | Performed by: INTERNAL MEDICINE

## 2024-02-29 PROCEDURE — 3078F DIAST BP <80 MM HG: CPT | Performed by: INTERNAL MEDICINE

## 2024-02-29 PROCEDURE — 1036F TOBACCO NON-USER: CPT | Performed by: INTERNAL MEDICINE

## 2024-02-29 PROCEDURE — 3074F SYST BP LT 130 MM HG: CPT | Performed by: INTERNAL MEDICINE

## 2024-02-29 PROCEDURE — 99214 OFFICE O/P EST MOD 30 MIN: CPT | Performed by: INTERNAL MEDICINE

## 2024-02-29 RX ORDER — BLOOD SUGAR DIAGNOSTIC
STRIP MISCELLANEOUS
Qty: 100 STRIP | Refills: 3 | Status: SHIPPED | OUTPATIENT
Start: 2024-02-29

## 2024-02-29 RX ORDER — AMLODIPINE BESYLATE 10 MG/1
10 TABLET ORAL DAILY
Qty: 90 TABLET | Refills: 3 | Status: SHIPPED | OUTPATIENT
Start: 2024-02-29

## 2024-02-29 RX ORDER — GLYBURIDE 2.5 MG/1
2.5 TABLET ORAL
Qty: 90 TABLET | Refills: 3 | Status: SHIPPED | OUTPATIENT
Start: 2024-02-29

## 2024-02-29 RX ORDER — ALENDRONATE SODIUM 70 MG/1
70 TABLET ORAL
Qty: 13 TABLET | Refills: 3 | Status: SHIPPED | OUTPATIENT
Start: 2024-02-29 | End: 2024-05-31 | Stop reason: SDUPTHER

## 2024-02-29 SDOH — ECONOMIC STABILITY: FOOD INSECURITY: WITHIN THE PAST 12 MONTHS, THE FOOD YOU BOUGHT JUST DIDN'T LAST AND YOU DIDN'T HAVE MONEY TO GET MORE.: NEVER TRUE

## 2024-02-29 SDOH — ECONOMIC STABILITY: HOUSING INSECURITY
IN THE LAST 12 MONTHS, WAS THERE A TIME WHEN YOU DID NOT HAVE A STEADY PLACE TO SLEEP OR SLEPT IN A SHELTER (INCLUDING NOW)?: NO

## 2024-02-29 SDOH — ECONOMIC STABILITY: GENERAL
WHICH OF THE FOLLOWING DO YOU KNOW HOW TO USE AND HAVE ACCESS TO EVERY DAY? (CHOOSE ALL THAT APPLY): DESKTOP COMPUTER, LAPTOP COMPUTER, OR TABLET WITH BROADBAND INTERNET CONNECTION

## 2024-02-29 SDOH — ECONOMIC STABILITY: FOOD INSECURITY: WITHIN THE PAST 12 MONTHS, YOU WORRIED THAT YOUR FOOD WOULD RUN OUT BEFORE YOU GOT MONEY TO BUY MORE.: NEVER TRUE

## 2024-02-29 SDOH — ECONOMIC STABILITY: GENERAL
WHICH OF THE FOLLOWING WOULD YOU LIKE TO GET CONNECTED TO IN ORDER TO RECEIVE A DISCOUNT OR FOR FREE? (CHOOSE ALL THAT APPLY): NONE OF THESE

## 2024-02-29 SDOH — HEALTH STABILITY: PHYSICAL HEALTH: ON AVERAGE, HOW MANY MINUTES DO YOU ENGAGE IN EXERCISE AT THIS LEVEL?: 0 MIN

## 2024-02-29 SDOH — ECONOMIC STABILITY: INCOME INSECURITY: IN THE LAST 12 MONTHS, WAS THERE A TIME WHEN YOU WERE NOT ABLE TO PAY THE MORTGAGE OR RENT ON TIME?: NO

## 2024-02-29 SDOH — HEALTH STABILITY: PHYSICAL HEALTH: ON AVERAGE, HOW MANY DAYS PER WEEK DO YOU ENGAGE IN MODERATE TO STRENUOUS EXERCISE (LIKE A BRISK WALK)?: 0 DAYS

## 2024-02-29 SDOH — ECONOMIC STABILITY: TRANSPORTATION INSECURITY
IN THE PAST 12 MONTHS, HAS LACK OF TRANSPORTATION KEPT YOU FROM MEETINGS, WORK, OR FROM GETTING THINGS NEEDED FOR DAILY LIVING?: NO

## 2024-02-29 SDOH — ECONOMIC STABILITY: TRANSPORTATION INSECURITY
IN THE PAST 12 MONTHS, HAS THE LACK OF TRANSPORTATION KEPT YOU FROM MEDICAL APPOINTMENTS OR FROM GETTING MEDICATIONS?: NO

## 2024-02-29 SDOH — ECONOMIC STABILITY: HOUSING INSECURITY: IN THE LAST 12 MONTHS, HOW MANY PLACES HAVE YOU LIVED?: 1

## 2024-02-29 ASSESSMENT — SOCIAL DETERMINANTS OF HEALTH (SDOH)
DO YOU BELONG TO ANY CLUBS OR ORGANIZATIONS SUCH AS CHURCH GROUPS UNIONS, FRATERNAL OR ATHLETIC GROUPS, OR SCHOOL GROUPS?: YES
HOW HARD IS IT FOR YOU TO PAY FOR THE VERY BASICS LIKE FOOD, HOUSING, MEDICAL CARE, AND HEATING?: NOT HARD AT ALL
WITHIN THE LAST YEAR, HAVE YOU BEEN AFRAID OF YOUR PARTNER OR EX-PARTNER?: NO
HOW OFTEN DO YOU ATTENT MEETINGS OF THE CLUB OR ORGANIZATION YOU BELONG TO?: MORE THAN 4 TIMES PER YEAR
WITHIN THE LAST YEAR, HAVE YOU BEEN KICKED, HIT, SLAPPED, OR OTHERWISE PHYSICALLY HURT BY YOUR PARTNER OR EX-PARTNER?: NO
HOW OFTEN DO YOU ATTEND CHURCH OR RELIGIOUS SERVICES?: MORE THAN 4 TIMES PER YEAR
WITHIN THE LAST YEAR, HAVE TO BEEN RAPED OR FORCED TO HAVE ANY KIND OF SEXUAL ACTIVITY BY YOUR PARTNER OR EX-PARTNER?: NO
IN THE PAST 12 MONTHS, HAS THE ELECTRIC, GAS, OIL, OR WATER COMPANY THREATENED TO SHUT OFF SERVICE IN YOUR HOME?: NO
HOW OFTEN DO YOU GET TOGETHER WITH FRIENDS OR RELATIVES?: TWICE A WEEK
WITHIN THE LAST YEAR, HAVE YOU BEEN HUMILIATED OR EMOTIONALLY ABUSED IN OTHER WAYS BY YOUR PARTNER OR EX-PARTNER?: NO
IN A TYPICAL WEEK, HOW MANY TIMES DO YOU TALK ON THE PHONE WITH FAMILY, FRIENDS, OR NEIGHBORS?: ONCE A WEEK

## 2024-02-29 ASSESSMENT — ACTIVITIES OF DAILY LIVING (ADL)
TOILETING: INDEPENDENT
DOING HOUSEWORK: INDEPENDENT
BATHING: INDEPENDENT
STIL DRIVING: YES
DRESSING YOURSELF: INDEPENDENT
USING TELEPHONE: INDEPENDENT
PILL BOX USED: YES
PREPARING MEALS: INDEPENDENT
TOILETING: INDEPENDENT
FEEDING YOURSELF: INDEPENDENT
GROCERY SHOPPING: INDEPENDENT
HEARING - LEFT EAR: HEARING AID
JUDGMENT_ADEQUATE_SAFELY_COMPLETE_DAILY_ACTIVITIES: YES
NEEDS ASSISTANCE WITH FOOD: INDEPENDENT
FEEDING: INDEPENDENT
USING TRANSPORTATION: INDEPENDENT
WALKS IN HOME: INDEPENDENT
HEARING - RIGHT EAR: HEARING AID
GROOMING: INDEPENDENT
MANAGING FINANCES: INDEPENDENT
DRESSING: INDEPENDENT
PATIENT'S MEMORY ADEQUATE TO SAFELY COMPLETE DAILY ACTIVITIES?: YES
EATING: INDEPENDENT
BATHING: INDEPENDENT
ADEQUATE_TO_COMPLETE_ADL: YES
ADEQUATE_TO_COMPLETE_ADL: YES
TAKING MEDICATION: INDEPENDENT
JUDGMENT_ADEQUATE_SAFELY_COMPLETE_DAILY_ACTIVITIES: YES

## 2024-02-29 ASSESSMENT — ENCOUNTER SYMPTOMS
FEVER: 0
SHORTNESS OF BREATH: 0
CHILLS: 0
WEAKNESS: 0
VOMITING: 0
FATIGUE: 0
PALPITATIONS: 0
HEMATURIA: 0
MYALGIAS: 0
HEADACHES: 0
DIFFICULTY URINATING: 0
NAUSEA: 0
DYSURIA: 0
ARTHRALGIAS: 0
DIZZINESS: 0
FREQUENCY: 0
LIGHT-HEADEDNESS: 0
DIARRHEA: 0
CONSTIPATION: 0
SORE THROAT: 0
COUGH: 0

## 2024-02-29 ASSESSMENT — LIFESTYLE VARIABLES
HOW OFTEN DO YOU HAVE A DRINK CONTAINING ALCOHOL: NEVER
AUDIT-C TOTAL SCORE: 0
HOW MANY STANDARD DRINKS CONTAINING ALCOHOL DO YOU HAVE ON A TYPICAL DAY: PATIENT DOES NOT DRINK
HOW OFTEN DO YOU HAVE SIX OR MORE DRINKS ON ONE OCCASION: NEVER
SKIP TO QUESTIONS 9-10: 1

## 2024-02-29 ASSESSMENT — PAIN SCALES - GENERAL: PAINLEVEL: 0-NO PAIN

## 2024-02-29 NOTE — PATIENT INSTRUCTIONS
Please bring a copy of your living will and DURABLE POWER OF  for healthcare to Dr. Vance's office so we can place a copy in your chart for emergencies.    Follow up Dr Vance in 4 months for HTN, DM etc   30 min    Get A1c before next appointment    Increase glyburide to 2.5mg twice a day with meals

## 2024-02-29 NOTE — ASSESSMENT & PLAN NOTE
Patient does have a power of  for healthcare and living well but did not bring them today.  Her  Artem maciel is the power of  for healthcare and patient is very clear that she does not want to be kept alive on machinery if she is not able to take care of herself she really would not like to be alive at all.  While reviewing her DNR versus full CODE STATUS patient also states she wants to be DNR Comfort Care arrest.  She says she is 84 years old and would rather be left alone to die quickly rather than being brought back from an acute event and having a long sustained hospital stay and her recovery.  Patient is clear and understands that if found this means that she would probably be left to die and if she is not resuscitated.  Patient understands that and prefers at so a DNR Comfort Care arrest order was signed and the original given to the patient.  Copy will be scanned into our medical records

## 2024-02-29 NOTE — ASSESSMENT & PLAN NOTE
Patient's current A1c has gone up again to 7.1%.  At this time I will increase the glipizide to 2.5 mg twice daily.  Patient is to watch for any symptoms of hypoglycemia and was warned that in the elderly this could be a dangerous medication especially since she is also on insulin.

## 2024-02-29 NOTE — ASSESSMENT & PLAN NOTE
Annual wellness visit completed.  CODE STATUS was updated to reflect her wishes to be a DNR Comfort Care arrest.  Patient does have a living well and power of  for healthcare and it is her  Artem.  She will bring us a copy as we currently do not have 1    Safety issues were all reviewed with the patient as well and have been met in her home.

## 2024-02-29 NOTE — ASSESSMENT & PLAN NOTE
Patient's bone density is due this May so she will be given a requisition at her next appointment    In the meantime she will stay on alendronate calcium and vitamin D.  Bisphosphonate holiday is due in May 2027

## 2024-02-29 NOTE — PROGRESS NOTES
Subjective   Reason for Visit: Hyacinth Crisostomo is an 84 y.o. female here for a Medicare Wellness visit.     Past Medical, Surgical, and Family History reviewed and updated in chart.    Reviewed all medications by prescribing practitioner or clinical pharmacist (such as prescriptions, OTCs, herbal therapies and supplements) and documented in the medical record.    Patient is here for annual wellness visit as well as management of her other medical problems including diabetes, chronic kidney disease hypertension cholesterol and osteoporosis.  Patient's just completed her annual blood work and it was reviewed with her in person today.  Overall her labs look good but her A1c has gone up again to 7.1%.  She does not think she has changed her diet much and does not think she could improve it either.  At this time we talked about either increasing her insulin or her glipizide and she would prefer to increase the glipizide.  We will increase her glipizide from 2.5 mg once daily to twice daily.  Patient was reminded to watch for hypoglycemic events since she is on both glipizide and insulin.  Patient states understanding and if she has any will notify me    Patient does have a living will and power of  for healthcare.  Her  Artem is her power of  for healthcare.  She was asked to bring us a copy for records  We also discussed DNR versus full CODE STATUS and the patient really wants to be DNR.  I explained that she is relatively young and healthy and that I would probably recommend full code however she is adamant that she wants to be DNR Comfort Care arrest.  She says if somebody finds her unconscious she really does not want to reassess be resuscitated at all.  She states she is 84 years old and would rather go quickly rather than a sustained long drawn out illness in the hospital and emergency room.  Patient is alert and oriented x 3 very cognizant and quite aware of what DNR Comfort Care arrest  "means.  For this reason we did sign off on a DNR Comfort Care arrest order, patient was given the original and told to place it in her home emergency personnel could find it and with a copy of her living will and power of .        Patient Care Team:  Lucita Vance MD as PCP - General  Lucita Vance MD as PCP - [employer] PCP     Review of Systems   Constitutional:  Negative for chills, fatigue and fever.   HENT:  Positive for hearing loss. Negative for sore throat.    Eyes:  Negative for visual disturbance.   Respiratory:  Negative for cough and shortness of breath.    Cardiovascular:  Negative for chest pain, palpitations and leg swelling.   Gastrointestinal:  Negative for constipation, diarrhea, nausea and vomiting.   Genitourinary:  Negative for difficulty urinating, dysuria, frequency, hematuria and urgency.   Musculoskeletal:  Negative for arthralgias and myalgias.   Skin:  Negative for rash.   Neurological:  Negative for dizziness, syncope, weakness, light-headedness and headaches.       Objective   Vitals:  /62   Pulse 71   Ht 1.651 m (5' 5\")   Wt 79.6 kg (175 lb 6.4 oz)   SpO2 98%   BMI 29.19 kg/m²       Physical Exam  Constitutional:       Appearance: Normal appearance.   HENT:      Head: Normocephalic and atraumatic.      Nose: Nose normal.   Eyes:      Extraocular Movements: Extraocular movements intact.      Pupils: Pupils are equal, round, and reactive to light.   Cardiovascular:      Rate and Rhythm: Normal rate and regular rhythm.   Pulmonary:      Breath sounds: Normal breath sounds.   Abdominal:      General: Abdomen is flat. Bowel sounds are normal.      Palpations: Abdomen is soft.   Musculoskeletal:      Right lower leg: No edema.      Left lower leg: No edema.   Neurological:      Mental Status: She is alert.         Assessment/Plan   Problem List Items Addressed This Visit       Age related osteoporosis    Overview     Alendronate started May 2022.  Her " first holiday or for bisphosphonates will be due in May 2027         Current Assessment & Plan     Patient's bone density is due this May so she will be given a requisition at her next appointment    In the meantime she will stay on alendronate calcium and vitamin D.  Bisphosphonate holiday is due in May 2027         Relevant Medications    alendronate (Fosamax) 70 mg tablet    Chronic kidney disease with active medical management without dialysis, stage 4 (severe) (CMS/[facility])    Current Assessment & Plan     Renal function tests are stable at this time.  She was reminded to drink plenty of fluids to protect the kidneys         Controlled diabetes mellitus with chronic kidney disease (CMS/[facility])    Current Assessment & Plan     Patient's current A1c has gone up again to 7.1%.  At this time I will increase the glipizide to 2.5 mg twice daily.  Patient is to watch for any symptoms of hypoglycemia and was warned that in the elderly this could be a dangerous medication especially since she is also on insulin.         Relevant Medications    OneTouch Ultra Test strip    Hypercholesterolemia    Current Assessment & Plan     Patient's cholesterol levels are good and liver liver enzymes remain normal so she will stay on atorvastatin 10 mg daily.         Hypertension    Current Assessment & Plan     Initial blood pressure was mildly elevated but recheck was good so no changes will be made.         Relevant Medications    amLODIPine (Norvasc) 10 mg tablet    DNR (do not resuscitate)    Overview     DNR Comfort Care Arrest 2/29/2024         Current Assessment & Plan     Patient does have a power of  for healthcare and living well but did not bring them today.  Her  Artem maciel is the power of  for healthcare and patient is very clear that she does not want to be kept alive on machinery if she is not able to take care of herself she really would not like to be alive at all.  While reviewing her DNR versus  full CODE STATUS patient also states she wants to be DNR Comfort Care arrest.  She says she is 84 years old and would rather be left alone to die quickly rather than being brought back from an acute event and having a long sustained hospital stay and her recovery.  Patient is clear and understands that if found this means that she would probably be left to die and if she is not resuscitated.  Patient understands that and prefers at so a DNR Comfort Care arrest order was signed and the original given to the patient.  Copy will be scanned into our medical records         Wellness examination    Current Assessment & Plan     Annual wellness visit completed.  CODE STATUS was updated to reflect her wishes to be a DNR Comfort Care arrest.  Patient does have a living well and power of  for healthcare and it is her  Artem.  She will bring us a copy as we currently do not have 1    Safety issues were all reviewed with the patient as well and have been met in her home.           Screening for multiple conditions    Current Assessment & Plan     Both depression screens and alcohol screens were negative.         Alcohol screening    Current Assessment & Plan     Alcohol screen was negative.          Other Visit Diagnoses       Routine general medical examination at health care facility    -  Primary    Type 2 diabetes mellitus without complication, without long-term current use of insulin (CMS/[facility])        Relevant Medications    glyBURIDE (Diabeta) 2.5 mg tablet    Other Relevant Orders    Hemoglobin A1C          Follow-up with me will be in 4 months with an A1c before that appointment.    Mammogram is due after July 25 of this year and will be ordered at her next appointment  Bone density is due in May but she would like to do that in the mammogram at the same time so they will both be ordered at her next appointment  Patient just had a colonoscopy in January of this year was told she needed to return in a year  because of a polyp.  She wants to know more detail but I do not have a copy of the pathology report so I asked that she follow-up with her gastroenterologist.

## 2024-02-29 NOTE — ASSESSMENT & PLAN NOTE
Renal function tests are stable at this time.  She was reminded to drink plenty of fluids to protect the kidneys

## 2024-06-10 DIAGNOSIS — N18.4 CHRONIC KIDNEY DISEASE WITH ACTIVE MEDICAL MANAGEMENT WITHOUT DIALYSIS, STAGE 4 (SEVERE) (MULTI): ICD-10-CM

## 2024-06-11 ENCOUNTER — LAB (OUTPATIENT)
Dept: LAB | Facility: LAB | Age: 84
End: 2024-06-11
Payer: MEDICARE

## 2024-06-11 DIAGNOSIS — N18.4 CHRONIC KIDNEY DISEASE WITH ACTIVE MEDICAL MANAGEMENT WITHOUT DIALYSIS, STAGE 4 (SEVERE) (MULTI): ICD-10-CM

## 2024-06-11 DIAGNOSIS — E11.9 TYPE 2 DIABETES MELLITUS WITHOUT COMPLICATION, WITHOUT LONG-TERM CURRENT USE OF INSULIN (MULTI): ICD-10-CM

## 2024-06-11 LAB
EST. AVERAGE GLUCOSE BLD GHB EST-MCNC: 166 MG/DL
HBA1C MFR BLD: 7.4 %

## 2024-06-11 PROCEDURE — 36415 COLL VENOUS BLD VENIPUNCTURE: CPT

## 2024-06-11 PROCEDURE — 83036 HEMOGLOBIN GLYCOSYLATED A1C: CPT

## 2024-06-11 PROCEDURE — 80048 BASIC METABOLIC PNL TOTAL CA: CPT

## 2024-06-12 ENCOUNTER — LAB (OUTPATIENT)
Dept: LAB | Facility: LAB | Age: 84
End: 2024-06-12
Payer: MEDICARE

## 2024-06-12 DIAGNOSIS — E87.5 HYPERKALEMIA: ICD-10-CM

## 2024-06-12 DIAGNOSIS — E87.5 HYPERKALEMIA: Primary | ICD-10-CM

## 2024-06-12 LAB
ANION GAP SERPL CALC-SCNC: 13 MMOL/L (ref 10–20)
BUN SERPL-MCNC: 49 MG/DL (ref 6–23)
CALCIUM SERPL-MCNC: 10.3 MG/DL (ref 8.6–10.6)
CHLORIDE SERPL-SCNC: 112 MMOL/L (ref 98–107)
CO2 SERPL-SCNC: 21 MMOL/L (ref 21–32)
CREAT SERPL-MCNC: 1.72 MG/DL (ref 0.5–1.05)
EGFRCR SERPLBLD CKD-EPI 2021: 29 ML/MIN/1.73M*2
GLUCOSE SERPL-MCNC: 90 MG/DL (ref 74–99)
POTASSIUM SERPL-SCNC: 6.1 MMOL/L (ref 3.5–5.3)
POTASSIUM SERPL-SCNC: 6.1 MMOL/L (ref 3.5–5.3)
SODIUM SERPL-SCNC: 140 MMOL/L (ref 136–145)

## 2024-06-12 PROCEDURE — 36415 COLL VENOUS BLD VENIPUNCTURE: CPT

## 2024-06-12 PROCEDURE — 84132 ASSAY OF SERUM POTASSIUM: CPT

## 2024-06-13 ENCOUNTER — APPOINTMENT (OUTPATIENT)
Dept: NEPHROLOGY | Facility: CLINIC | Age: 84
End: 2024-06-13
Payer: MEDICARE

## 2024-06-13 VITALS
WEIGHT: 174.2 LBS | HEIGHT: 65 IN | SYSTOLIC BLOOD PRESSURE: 126 MMHG | BODY MASS INDEX: 29.02 KG/M2 | DIASTOLIC BLOOD PRESSURE: 58 MMHG | HEART RATE: 72 BPM

## 2024-06-13 DIAGNOSIS — N18.4 CONTROLLED TYPE 2 DIABETES MELLITUS WITH STAGE 4 CHRONIC KIDNEY DISEASE, WITHOUT LONG-TERM CURRENT USE OF INSULIN (MULTI): ICD-10-CM

## 2024-06-13 DIAGNOSIS — E11.22 CONTROLLED TYPE 2 DIABETES MELLITUS WITH STAGE 4 CHRONIC KIDNEY DISEASE, WITHOUT LONG-TERM CURRENT USE OF INSULIN (MULTI): ICD-10-CM

## 2024-06-13 DIAGNOSIS — E78.00 HYPERCHOLESTEROLEMIA: ICD-10-CM

## 2024-06-13 DIAGNOSIS — E87.5 HYPERKALEMIA: ICD-10-CM

## 2024-06-13 DIAGNOSIS — N18.4 CHRONIC KIDNEY DISEASE WITH ACTIVE MEDICAL MANAGEMENT WITHOUT DIALYSIS, STAGE 4 (SEVERE) (MULTI): Primary | ICD-10-CM

## 2024-06-13 DIAGNOSIS — I10 PRIMARY HYPERTENSION: ICD-10-CM

## 2024-06-13 PROCEDURE — 1159F MED LIST DOCD IN RCRD: CPT | Performed by: CLINICAL NURSE SPECIALIST

## 2024-06-13 PROCEDURE — 1036F TOBACCO NON-USER: CPT | Performed by: CLINICAL NURSE SPECIALIST

## 2024-06-13 PROCEDURE — 3078F DIAST BP <80 MM HG: CPT | Performed by: CLINICAL NURSE SPECIALIST

## 2024-06-13 PROCEDURE — 1160F RVW MEDS BY RX/DR IN RCRD: CPT | Performed by: CLINICAL NURSE SPECIALIST

## 2024-06-13 PROCEDURE — 3074F SYST BP LT 130 MM HG: CPT | Performed by: CLINICAL NURSE SPECIALIST

## 2024-06-13 PROCEDURE — 99213 OFFICE O/P EST LOW 20 MIN: CPT | Performed by: CLINICAL NURSE SPECIALIST

## 2024-06-13 ASSESSMENT — ENCOUNTER SYMPTOMS
ENDOCRINE NEGATIVE: 1
NEUROLOGICAL NEGATIVE: 1
PSYCHIATRIC NEGATIVE: 1
CARDIOVASCULAR NEGATIVE: 1
GASTROINTESTINAL NEGATIVE: 1
CONSTITUTIONAL NEGATIVE: 1
MUSCULOSKELETAL NEGATIVE: 1
RESPIRATORY NEGATIVE: 1

## 2024-06-13 NOTE — ASSESSMENT & PLAN NOTE
Potassium was elevated at 6.1, lisinopril was discontinued, she states that she has been eating a significant amount of nuts lately, she is eating them several times a day, she also discontinued these, will discontinue lisinopril, repeat labs in 1 week, at that time she will also let me know what her blood pressures are

## 2024-06-13 NOTE — ASSESSMENT & PLAN NOTE
Blood pressure is currently well-controlled, only on amlodipine 10 mg at this time, lisinopril 40 mg was discontinued secondary to hyperkalemia, she will call me in 1 week with her blood pressure readings if her blood pressure is elevated we will change her amlodipine to nifedipine to see if we can get control with just 1 medication, if not we can we will need to add hydralazine to her regimen

## 2024-06-13 NOTE — PROGRESS NOTES
Subjective   Patient ID: Hyacinth Crisostomo is a 84 y.o. female who presents for Follow-up (6 month ck /Review labs 6/11).  Patient being seen in follow-up for chronic kidney disease stage IV with history of diabetes and hypertension, also has history of hyperkalemia    Labs reviewed  Glucose 98  Sodium 140, potassium 6.1, chloride 112, bicarb 21  Renal function with BUN of 49 and creatinine of 1.72, GFR is 29  Hemoglobin A1c 7.4  Potassium was repeated on the 12th at which time it continues to be 6.1, she was called on the 11th and asked to discontinue her lisinopril which has been stopped at this time    She is doing well  She denies any complaints  She has no complaints palpitations  Her blood pressure has been well-controlled  She has no lightheadedness or dizziness  She is voiding without difficulties  She has no edema          Review of Systems   Constitutional: Negative.    Respiratory: Negative.     Cardiovascular: Negative.    Gastrointestinal: Negative.    Endocrine: Negative.    Genitourinary: Negative.    Musculoskeletal: Negative.    Skin: Negative.    Neurological: Negative.    Psychiatric/Behavioral: Negative.         Objective   Physical Exam  Vitals reviewed.   Constitutional:       Appearance: Normal appearance.   HENT:      Head: Normocephalic.   Cardiovascular:      Rate and Rhythm: Normal rate and regular rhythm.   Pulmonary:      Effort: Pulmonary effort is normal.      Breath sounds: Normal breath sounds.   Abdominal:      Palpations: Abdomen is soft.   Musculoskeletal:         General: Normal range of motion.   Skin:     General: Skin is warm and dry.   Neurological:      Mental Status: She is alert and oriented to person, place, and time.   Psychiatric:         Mood and Affect: Mood normal.         Behavior: Behavior normal.         Assessment/Plan   Problem List Items Addressed This Visit             ICD-10-CM    Chronic kidney disease with active medical management without dialysis, stage 4  (severe) (Multi) - Primary N18.4     Renal function is stable with creatinine of 1.72, GFR is 29, blood pressure is well-controlled, last hemoglobin A1c slightly elevated at 7.4, I was spoken with her and her  in the past secondary to SGLT2 but they do not wish to take these, not using nephrotoxic medications         Relevant Orders    Basic metabolic panel    Controlled diabetes mellitus with chronic kidney disease (Multi) E11.22    Hypercholesterolemia E78.00    Hyperkalemia E87.5     Potassium was elevated at 6.1, lisinopril was discontinued, she states that she has been eating a significant amount of nuts lately, she is eating them several times a day, she also discontinued these, will discontinue lisinopril, repeat labs in 1 week, at that time she will also let me know what her blood pressures are         Hypertension I10     Blood pressure is currently well-controlled, only on amlodipine 10 mg at this time, lisinopril 40 mg was discontinued secondary to hyperkalemia, she will call me in 1 week with her blood pressure readings if her blood pressure is elevated we will change her amlodipine to nifedipine to see if we can get control with just 1 medication, if not we can we will need to add hydralazine to her regimen            CKD IV with baseline creatinine about 1.6-1.9  Diabetes mellitus type 2 for 40 years  Hypertension  Glaucoma  Left vision loss  Dyslipidemia  Slight hyperkalemia  Vitamin D deficiency   Peripheral edema: Slight        SEN Peterson-KEMAR, [facility] 06/13/24 2:01 PM

## 2024-06-13 NOTE — ASSESSMENT & PLAN NOTE
Renal function is stable with creatinine of 1.72, GFR is 29, blood pressure is well-controlled, last hemoglobin A1c slightly elevated at 7.4, I was spoken with her and her  in the past secondary to SGLT2 but they do not wish to take these, not using nephrotoxic medications

## 2024-06-19 ENCOUNTER — LAB (OUTPATIENT)
Dept: LAB | Facility: LAB | Age: 84
End: 2024-06-19
Payer: MEDICARE

## 2024-06-19 DIAGNOSIS — N18.4 CHRONIC KIDNEY DISEASE WITH ACTIVE MEDICAL MANAGEMENT WITHOUT DIALYSIS, STAGE 4 (SEVERE) (MULTI): ICD-10-CM

## 2024-06-19 PROCEDURE — 80048 BASIC METABOLIC PNL TOTAL CA: CPT

## 2024-06-19 PROCEDURE — 36415 COLL VENOUS BLD VENIPUNCTURE: CPT

## 2024-06-20 LAB
ANION GAP SERPL CALC-SCNC: 15 MMOL/L (ref 10–20)
BUN SERPL-MCNC: 37 MG/DL (ref 6–23)
CALCIUM SERPL-MCNC: 9.7 MG/DL (ref 8.6–10.6)
CHLORIDE SERPL-SCNC: 109 MMOL/L (ref 98–107)
CO2 SERPL-SCNC: 20 MMOL/L (ref 21–32)
CREAT SERPL-MCNC: 1.76 MG/DL (ref 0.5–1.05)
EGFRCR SERPLBLD CKD-EPI 2021: 28 ML/MIN/1.73M*2
GLUCOSE SERPL-MCNC: 101 MG/DL (ref 74–99)
POTASSIUM SERPL-SCNC: 5 MMOL/L (ref 3.5–5.3)
SODIUM SERPL-SCNC: 139 MMOL/L (ref 136–145)

## 2024-06-27 ENCOUNTER — APPOINTMENT (OUTPATIENT)
Dept: PRIMARY CARE | Facility: CLINIC | Age: 84
End: 2024-06-27
Payer: MEDICARE

## 2024-06-27 VITALS
BODY MASS INDEX: 29.32 KG/M2 | HEIGHT: 65 IN | SYSTOLIC BLOOD PRESSURE: 137 MMHG | HEART RATE: 69 BPM | DIASTOLIC BLOOD PRESSURE: 71 MMHG | OXYGEN SATURATION: 95 % | WEIGHT: 176 LBS

## 2024-06-27 DIAGNOSIS — Z78.0 ASYMPTOMATIC MENOPAUSAL STATE: ICD-10-CM

## 2024-06-27 DIAGNOSIS — E11.22 CONTROLLED TYPE 2 DIABETES MELLITUS WITH STAGE 4 CHRONIC KIDNEY DISEASE, WITHOUT LONG-TERM CURRENT USE OF INSULIN (MULTI): ICD-10-CM

## 2024-06-27 DIAGNOSIS — I10 PRIMARY HYPERTENSION: ICD-10-CM

## 2024-06-27 DIAGNOSIS — M81.0 AGE-RELATED OSTEOPOROSIS WITHOUT CURRENT PATHOLOGICAL FRACTURE: ICD-10-CM

## 2024-06-27 DIAGNOSIS — Z12.31 SCREENING MAMMOGRAM FOR BREAST CANCER: Primary | ICD-10-CM

## 2024-06-27 DIAGNOSIS — N18.4 CONTROLLED TYPE 2 DIABETES MELLITUS WITH STAGE 4 CHRONIC KIDNEY DISEASE, WITHOUT LONG-TERM CURRENT USE OF INSULIN (MULTI): ICD-10-CM

## 2024-06-27 DIAGNOSIS — N18.4 CHRONIC KIDNEY DISEASE WITH ACTIVE MEDICAL MANAGEMENT WITHOUT DIALYSIS, STAGE 4 (SEVERE) (MULTI): ICD-10-CM

## 2024-06-27 PROBLEM — N28.9 RENAL INSUFFICIENCY: Status: ACTIVE | Noted: 2024-06-27

## 2024-06-27 PROCEDURE — 99214 OFFICE O/P EST MOD 30 MIN: CPT | Performed by: INTERNAL MEDICINE

## 2024-06-27 PROCEDURE — 1157F ADVNC CARE PLAN IN RCRD: CPT | Performed by: INTERNAL MEDICINE

## 2024-06-27 PROCEDURE — 1159F MED LIST DOCD IN RCRD: CPT | Performed by: INTERNAL MEDICINE

## 2024-06-27 PROCEDURE — 1160F RVW MEDS BY RX/DR IN RCRD: CPT | Performed by: INTERNAL MEDICINE

## 2024-06-27 PROCEDURE — 3075F SYST BP GE 130 - 139MM HG: CPT | Performed by: INTERNAL MEDICINE

## 2024-06-27 PROCEDURE — 1126F AMNT PAIN NOTED NONE PRSNT: CPT | Performed by: INTERNAL MEDICINE

## 2024-06-27 PROCEDURE — 3078F DIAST BP <80 MM HG: CPT | Performed by: INTERNAL MEDICINE

## 2024-06-27 PROCEDURE — 1036F TOBACCO NON-USER: CPT | Performed by: INTERNAL MEDICINE

## 2024-06-27 RX ORDER — METOPROLOL TARTRATE 100 MG/1
100 TABLET ORAL 2 TIMES DAILY
Qty: 180 TABLET | Refills: 3 | Status: SHIPPED | OUTPATIENT
Start: 2024-06-27

## 2024-06-27 RX ORDER — LANCETS
EACH MISCELLANEOUS
Qty: 100 EACH | Refills: 3 | Status: SHIPPED | OUTPATIENT
Start: 2024-06-27

## 2024-06-27 ASSESSMENT — ENCOUNTER SYMPTOMS
FEVER: 0
LIGHT-HEADEDNESS: 0
DYSURIA: 0
COUGH: 0
NAUSEA: 0
DIARRHEA: 0
TROUBLE SWALLOWING: 0
CONSTIPATION: 0
PALPITATIONS: 0
DIZZINESS: 0
ARTHRALGIAS: 0
SORE THROAT: 0
FREQUENCY: 0
FATIGUE: 0
ABDOMINAL PAIN: 0
VOMITING: 0
SHORTNESS OF BREATH: 0

## 2024-06-27 ASSESSMENT — PATIENT HEALTH QUESTIONNAIRE - PHQ9
SUM OF ALL RESPONSES TO PHQ9 QUESTIONS 1 AND 2: 0
2. FEELING DOWN, DEPRESSED OR HOPELESS: NOT AT ALL
1. LITTLE INTEREST OR PLEASURE IN DOING THINGS: NOT AT ALL

## 2024-06-27 ASSESSMENT — PAIN SCALES - GENERAL: PAINLEVEL: 0-NO PAIN

## 2024-06-27 NOTE — PROGRESS NOTES
Subjective   Patient ID: Hyacinth Crisostomo is a 84 y.o. female who presents for 4 month follow up for diabetes, HTN, and cholesterol management.    Patient is here today for follow-up on hypertension, high cholesterol osteoporosis diabetes and medication management.  Patient's most recent A1c is a little higher at 7.4% so I encouraged her to work on diet and exercise and if it remains high we may need to repeat it.  Patient recently stopped her lisinopril because of repeatedly high potassium levels per the request of her nephrologist.  Now that she is off the lisinopril her blood pressure has also gone up a little bit so we need to adjust her other medications.  Patient also requests a refill of her One Touch Dellico lancets as she is out of them.        Review of Systems   Constitutional:  Negative for fatigue and fever.   HENT:  Negative for sore throat and trouble swallowing.    Eyes:  Negative for visual disturbance.   Respiratory:  Negative for cough and shortness of breath.    Cardiovascular:  Negative for chest pain, palpitations and leg swelling.   Gastrointestinal:  Negative for abdominal pain, constipation, diarrhea, nausea and vomiting.   Genitourinary:  Negative for dysuria and frequency.   Musculoskeletal:  Negative for arthralgias.   Skin:  Negative for rash.   Neurological:  Negative for dizziness and light-headedness.       Objective   Medication Documentation Review Audit       Reviewed by Lucita Vance MD (Physician) on 06/27/24 at 1435      Medication Order Taking? Sig Documenting Provider Last Dose Status   alendronate (Fosamax) 70 mg tablet [id number] No Take 1 tablet (70 mg) by mouth 1 (one) time per week. Jeannine Landa,  Taking Active   amLODIPine (Norvasc) 10 mg tablet [id number] No Take 1 tablet (10 mg) by mouth once daily. Lucita Vance MD Taking Active   atorvastatin (Lipitor) 10 mg tablet [id number] No Take 1 tablet (10 mg) by mouth once daily. as directed Lucita Vance MD Taking  "Active   calcium carbonate-vit D3-min 600 mg calcium- 400 unit tablet [id number] No Take 1 tablet by mouth once daily. Historical Provider, MD Taking Active   cholecalciferol (Vitamin D-3) 10 MCG (400 UNIT) tablet [id number] No Take 1 tablet (10 mcg) by mouth once daily. Historical Provider, MD Taking Active   Combigan 0.2-0.5 % ophthalmic solution [id number] No Administer into affected eye(s). Historical Provider, MD Taking Active   glyBURIDE (Diabeta) 2.5 mg tablet [id number] No Take 1 tablet (2.5 mg) by mouth 2 times a day with meals. Lucita Vance MD Taking Active   Lantus Solostar U-100 Insulin 100 unit/mL (3 mL) pen [id number] No Inject 15 Units under the skin once daily at bedtime. Take as directed per insulin instructions. DIANA Peterson DNP Taking Active   metoprolol tartrate (Lopressor) 50 mg tablet [id number] No TAKE 1 TABLET (50 MG) BY MOUTH 2 TIMES A DAY WITH MEALS. Jeannine Landa,  Taking Active   OneTouch Ultra Test strip [id number] No USE 1 STRIP DAILY AND AS NEEDED IF SYMPTOMATIC Lucita Vance MD Taking Active   pen needle, diabetic (BD Ultra-Fine Short Pen Needle) 31 gauge x 5/16\" needle [id number] No USE AS DIRECTED. DIANA Peterson DNP Taking Active   prednisoLONE acetate (Pred-Forte) 1 % ophthalmic suspension [id number] No INSTILL 1 DROP INTO LEFT EYE ONCE A DAY Historical Provider, MD Taking Active   SITagliptin phosphate (Januvia) 100 mg tablet [id number] No Take 1 tablet (100 mg) by mouth once daily. DIANA Peterson DNP Taking Active   travoprost (Travatan Z) 0.004 % drops ophthalmic solution [id number] No Administer 1 drop into both eyes once daily at bedtime. Historical Provider, MD Taking Active                  Allergies   Allergen Reactions    Cpm-Pseudoephed-Acetaminophen Hives     Physical Exam  Constitutional:       Appearance: Normal appearance.   HENT:      Head: Normocephalic and atraumatic.      Nose: Nose normal.   Eyes:      Extraocular Movements: Extraocular " "movements intact.      Pupils: Pupils are equal, round, and reactive to light.   Cardiovascular:      Rate and Rhythm: Normal rate and regular rhythm.   Pulmonary:      Breath sounds: Normal breath sounds.   Abdominal:      General: Abdomen is flat. Bowel sounds are normal.      Palpations: Abdomen is soft.   Musculoskeletal:      Right lower leg: No edema.      Left lower leg: No edema.   Neurological:      Mental Status: She is alert.     /71 (BP Location: Left arm, Patient Position: Sitting)   Pulse 69   Ht 1.651 m (5' 5\")   Wt 79.8 kg (176 lb)   SpO2 95%   BMI 29.29 kg/m²       Assessment/Plan   Problem List Items Addressed This Visit       Age related osteoporosis     Patient is due for bone density now was given a requisition.  In the meantime she remains on calcium, vitamin D and alendronate         Chronic kidney disease with active medical management without dialysis, stage 4 (severe) (Multi)     Patient is seeing nephrology and they did discontinue lisinopril due to the elevated potassium levels.         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  She is watching her diet more closely and admits she was eating a lot of ice cream and poor choices through the spring.         Relevant Medications    lancets misc    Hypertension     Patient's lisinopril was discontinued due to high potassium levels.  At this time she will continue her amlodipine 10 mg daily and I will increase her metoprolol tartrate to 100 mg twice daily.  She can take 2 of the 50 mg tablets twice a day until she runs out and gets the new prescription.    Patient will follow-up with me in 1 month to recheck her blood pressure on the new medication         Relevant Medications    metoprolol tartrate (Lopressor) 100 mg tablet     Other Visit Diagnoses       Screening mammogram for breast cancer    -  Primary    Relevant Orders    BI mammo bilateral " screening tomosynthesis    Asymptomatic menopausal state        Relevant Orders    XR DEXA bone density                   It has been a pleasure seeing you.  Lucita Vance MD

## 2024-06-27 NOTE — ASSESSMENT & PLAN NOTE
Patient's lisinopril was discontinued due to high potassium levels.  At this time she will continue her amlodipine 10 mg daily and I will increase her metoprolol tartrate to 100 mg twice daily.  She can take 2 of the 50 mg tablets twice a day until she runs out and gets the new prescription.    Patient will follow-up with me in 1 month to recheck her blood pressure on the new medication

## 2024-06-27 NOTE — ASSESSMENT & PLAN NOTE
Patient is due for bone density now was given a requisition.  In the meantime she remains on calcium, vitamin D and alendronate

## 2024-06-27 NOTE — ASSESSMENT & PLAN NOTE
Patient is seeing nephrology and they did discontinue lisinopril due to the elevated potassium levels.

## 2024-06-27 NOTE — PATIENT INSTRUCTIONS
Get ammo after 7/26/24    Get DXA    Increase metoprolol tart. To 100mg twice a day (You can take 2 of the 50 mg pills until you run out)    Follow up Dr Vance in 1 month for BP check on new dose of metolprolol

## 2024-06-27 NOTE — ASSESSMENT & PLAN NOTE
Patient's current A1c as of earlier this month was 7.4%.  She is watching her diet more closely and admits she was eating a lot of ice cream and poor choices through the spring.

## 2024-07-31 ENCOUNTER — HOSPITAL ENCOUNTER (OUTPATIENT)
Dept: RADIOLOGY | Facility: CLINIC | Age: 84
Discharge: HOME | End: 2024-07-31
Payer: MEDICARE

## 2024-07-31 VITALS — WEIGHT: 176 LBS | HEIGHT: 63 IN | BODY MASS INDEX: 31.18 KG/M2

## 2024-07-31 DIAGNOSIS — Z12.31 SCREENING MAMMOGRAM FOR BREAST CANCER: ICD-10-CM

## 2024-07-31 DIAGNOSIS — Z78.0 ASYMPTOMATIC MENOPAUSAL STATE: ICD-10-CM

## 2024-07-31 PROCEDURE — 77067 SCR MAMMO BI INCL CAD: CPT

## 2024-07-31 PROCEDURE — 77080 DXA BONE DENSITY AXIAL: CPT | Performed by: STUDENT IN AN ORGANIZED HEALTH CARE EDUCATION/TRAINING PROGRAM

## 2024-07-31 PROCEDURE — 77067 SCR MAMMO BI INCL CAD: CPT | Performed by: RADIOLOGY

## 2024-07-31 PROCEDURE — 77080 DXA BONE DENSITY AXIAL: CPT

## 2024-07-31 PROCEDURE — 77063 BREAST TOMOSYNTHESIS BI: CPT | Performed by: RADIOLOGY

## 2024-07-31 ASSESSMENT — LIFESTYLE VARIABLES
3_OR_MORE_DRINKS_PER_DAY: N
CURRENT_SMOKER: N

## 2024-08-01 ENCOUNTER — APPOINTMENT (OUTPATIENT)
Dept: PRIMARY CARE | Facility: CLINIC | Age: 84
End: 2024-08-01
Payer: MEDICARE

## 2024-08-01 VITALS
HEIGHT: 63 IN | HEART RATE: 67 BPM | SYSTOLIC BLOOD PRESSURE: 179 MMHG | BODY MASS INDEX: 31.96 KG/M2 | DIASTOLIC BLOOD PRESSURE: 75 MMHG | OXYGEN SATURATION: 95 % | WEIGHT: 180.4 LBS

## 2024-08-01 DIAGNOSIS — E11.22 CONTROLLED TYPE 2 DIABETES MELLITUS WITH STAGE 4 CHRONIC KIDNEY DISEASE, WITHOUT LONG-TERM CURRENT USE OF INSULIN (MULTI): ICD-10-CM

## 2024-08-01 DIAGNOSIS — I10 PRIMARY HYPERTENSION: Primary | ICD-10-CM

## 2024-08-01 DIAGNOSIS — N18.4 CHRONIC KIDNEY DISEASE WITH ACTIVE MEDICAL MANAGEMENT WITHOUT DIALYSIS, STAGE 4 (SEVERE) (MULTI): ICD-10-CM

## 2024-08-01 DIAGNOSIS — E11.9 TYPE 2 DIABETES MELLITUS WITHOUT COMPLICATION, WITHOUT LONG-TERM CURRENT USE OF INSULIN (MULTI): ICD-10-CM

## 2024-08-01 DIAGNOSIS — N18.4 CONTROLLED TYPE 2 DIABETES MELLITUS WITH STAGE 4 CHRONIC KIDNEY DISEASE, WITHOUT LONG-TERM CURRENT USE OF INSULIN (MULTI): ICD-10-CM

## 2024-08-01 DIAGNOSIS — E87.5 HYPERKALEMIA: ICD-10-CM

## 2024-08-01 PROCEDURE — 3077F SYST BP >= 140 MM HG: CPT | Performed by: INTERNAL MEDICINE

## 2024-08-01 PROCEDURE — 1036F TOBACCO NON-USER: CPT | Performed by: INTERNAL MEDICINE

## 2024-08-01 PROCEDURE — 1159F MED LIST DOCD IN RCRD: CPT | Performed by: INTERNAL MEDICINE

## 2024-08-01 PROCEDURE — 1157F ADVNC CARE PLAN IN RCRD: CPT | Performed by: INTERNAL MEDICINE

## 2024-08-01 PROCEDURE — 1126F AMNT PAIN NOTED NONE PRSNT: CPT | Performed by: INTERNAL MEDICINE

## 2024-08-01 PROCEDURE — 99213 OFFICE O/P EST LOW 20 MIN: CPT | Performed by: INTERNAL MEDICINE

## 2024-08-01 PROCEDURE — 1160F RVW MEDS BY RX/DR IN RCRD: CPT | Performed by: INTERNAL MEDICINE

## 2024-08-01 PROCEDURE — 3078F DIAST BP <80 MM HG: CPT | Performed by: INTERNAL MEDICINE

## 2024-08-01 RX ORDER — GLYBURIDE 2.5 MG/1
2.5 TABLET ORAL
Qty: 90 TABLET | Refills: 3 | Status: SHIPPED | OUTPATIENT
Start: 2024-08-01

## 2024-08-01 RX ORDER — DOXAZOSIN 2 MG/1
2 TABLET ORAL NIGHTLY
Qty: 30 TABLET | Refills: 1 | Status: SHIPPED | OUTPATIENT
Start: 2024-08-01 | End: 2025-01-28

## 2024-08-01 ASSESSMENT — ENCOUNTER SYMPTOMS
FREQUENCY: 0
ABDOMINAL PAIN: 0
CONSTIPATION: 0
DYSURIA: 0
ARTHRALGIAS: 0
PALPITATIONS: 0
TROUBLE SWALLOWING: 0
FEVER: 0
SORE THROAT: 0
VOMITING: 0
COUGH: 0
LIGHT-HEADEDNESS: 0
FATIGUE: 0
SHORTNESS OF BREATH: 0
NAUSEA: 0
DIZZINESS: 0
DIARRHEA: 0

## 2024-08-01 ASSESSMENT — PAIN SCALES - GENERAL: PAINLEVEL: 0-NO PAIN

## 2024-08-01 NOTE — PROGRESS NOTES
Subjective   Patient ID: Hyacinth Crisostomo is a 84 y.o. female who presents for 1 month follow up for HTN management.    Patient is here today for follow-up on hypertension.  Patient was on lisinopril but kept having elevated potassium levels due to her renal disease.  Lisinopril was discontinued potassium has improved however the patient's blood pressure is now not well-controlled.  Patient remains on amlodipine 10 mg daily and metoprolol tartrate 100 mg twice daily.          Review of Systems   Constitutional:  Negative for fatigue and fever.   HENT:  Negative for sore throat and trouble swallowing.    Eyes:  Negative for visual disturbance.   Respiratory:  Negative for cough and shortness of breath.    Cardiovascular:  Negative for chest pain, palpitations and leg swelling.   Gastrointestinal:  Negative for abdominal pain, constipation, diarrhea, nausea and vomiting.   Genitourinary:  Negative for dysuria and frequency.   Musculoskeletal:  Negative for arthralgias.   Skin:  Negative for rash.   Neurological:  Negative for dizziness and light-headedness.       Objective   Medication Documentation Review Audit       Reviewed by Lucita Vance MD (Physician) on 08/01/24 at 1425      Medication Order Taking? Sig Documenting Provider Last Dose Status   alendronate (Fosamax) 70 mg tablet [id number] No Take 1 tablet (70 mg) by mouth 1 (one) time per week. Jeannine Landa,  Taking Active   amLODIPine (Norvasc) 10 mg tablet [id number] No Take 1 tablet (10 mg) by mouth once daily. Lucita Vance MD Taking Active   atorvastatin (Lipitor) 10 mg tablet [id number] No Take 1 tablet (10 mg) by mouth once daily. as directed Lucita Vance MD Taking Active   calcium carbonate-vit D3-min 600 mg calcium- 400 unit tablet [id number] No Take 1 tablet by mouth once daily. Historical Provider, MD Taking Active   cholecalciferol (Vitamin D-3) 10 MCG (400 UNIT) tablet [id number] No Take 1 tablet (10 mcg) by mouth once daily. Historical  "Provider, MD Taking Active   Combigan 0.2-0.5 % ophthalmic solution [id number] No Administer into affected eye(s). Historical Provider, MD Taking Active   Discontinued 08/01/24 1423   glyBURIDE (Diabeta) 2.5 mg tablet [id number]  Take 1 tablet (2.5 mg) by mouth 2 times daily (morning and late afternoon). Lucita Vance MD  Active   lancets misc [id number]  Dispense One touch Delico lancets #100 with 3 refills to be used once a day and PRN if symptomatic Lucita Vance MD  Active   Lantus Solostar U-100 Insulin 100 unit/mL (3 mL) pen [id number] No Inject 15 Units under the skin once daily at bedtime. Take as directed per insulin instructions. DIANA Peterson DNP Taking Active   metoprolol tartrate (Lopressor) 100 mg tablet [id number]  Take 1 tablet (100 mg) by mouth 2 times a day. Lucita Vance MD  Active   OneTouch Ultra Test strip [id number] No USE 1 STRIP DAILY AND AS NEEDED IF SYMPTOMATIC Lucita Vance MD Taking Active   pen needle, diabetic (BD Ultra-Fine Short Pen Needle) 31 gauge x 5/16\" needle [id number] No USE AS DIRECTED. DIANA Peterson DNP Taking Active   prednisoLONE acetate (Pred-Forte) 1 % ophthalmic suspension [id number] No INSTILL 1 DROP INTO LEFT EYE ONCE A DAY Historical Provider, MD Taking Active   SITagliptin phosphate (Januvia) 100 mg tablet [id number] No Take 1 tablet (100 mg) by mouth once daily. DIANA Peterson DNP Taking Active   travoprost (Travatan Z) 0.004 % drops ophthalmic solution [id number] No Administer 1 drop into both eyes once daily at bedtime. Historical Provider, MD Taking Active                  Allergies   Allergen Reactions    Cpm-Pseudoephed-Acetaminophen Hives       /75 (BP Location: Left arm, Patient Position: Sitting)   Pulse 67   Ht 1.6 m (5' 3\")   Wt 81.8 kg (180 lb 6.4 oz)   SpO2 95%   BMI 31.96 kg/m²     Physical Exam  Constitutional:       Appearance: Normal appearance.   HENT:      Head: Normocephalic and atraumatic.      Nose: Nose " normal.   Eyes:      Extraocular Movements: Extraocular movements intact.      Pupils: Pupils are equal, round, and reactive to light.   Cardiovascular:      Rate and Rhythm: Normal rate and regular rhythm.   Pulmonary:      Breath sounds: Normal breath sounds.   Abdominal:      General: Abdomen is flat. Bowel sounds are normal.      Palpations: Abdomen is soft.   Musculoskeletal:      Right lower leg: No edema.      Left lower leg: No edema.   Neurological:      Mental Status: She is alert.           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         Hyperkalemia     Potassium levels have improved now that she is off the ACE inhibitor or lisinopril.         Hypertension - Primary     Blood pressure has poor control today at 179/75.  Patient will continue amlodipine 10 mg daily, continue metoprolol tartrate 100 mg twice daily and to this I will add doxazosin 2 mg at bedtime.  Patient will follow-up with me in 1 month to check on her blood pressure.         Relevant Medications    doxazosin (Cardura) 2 mg tablet     Other Visit Diagnoses       Type 2 diabetes mellitus without complication, without long-term current use of insulin (Multi)        Relevant Medications    glyBURIDE (Diabeta) 2.5 mg tablet                   It has been a pleasure seeing you.  Lucita Vance MD

## 2024-08-01 NOTE — ASSESSMENT & PLAN NOTE
Blood pressure has poor control today at 179/75.  Patient will continue amlodipine 10 mg daily, continue metoprolol tartrate 100 mg twice daily and to this I will add doxazosin 2 mg at bedtime.  Patient will follow-up with me in 1 month to check on her blood pressure.

## 2024-08-06 DIAGNOSIS — R92.8 ABNORMAL MAMMOGRAM: Primary | ICD-10-CM

## 2024-08-07 ENCOUNTER — TELEPHONE (OUTPATIENT)
Dept: PRIMARY CARE | Facility: CLINIC | Age: 84
End: 2024-08-07
Payer: MEDICARE

## 2024-08-07 NOTE — TELEPHONE ENCOUNTER
Called patient to schedule her follow up diagnostic mammogram. Patient refused to have me schedule her because she does not want to go to [facility]. [facility] does not do diagnostic mammos. States she will talk to doctor first then decide about mammogram follow up.

## 2024-08-07 NOTE — TELEPHONE ENCOUNTER
Explained to Hyacinth what Dr Vance's recommendations were pertaining to her abnormal mammogram, she still declines scheduling a follow up at this time and would like to talk to Dr Vance at her upcoming appointment on 9/4/24.

## 2024-09-04 ENCOUNTER — APPOINTMENT (OUTPATIENT)
Dept: PRIMARY CARE | Facility: CLINIC | Age: 84
End: 2024-09-04
Payer: MEDICARE

## 2024-09-04 VITALS
DIASTOLIC BLOOD PRESSURE: 76 MMHG | HEART RATE: 61 BPM | OXYGEN SATURATION: 96 % | SYSTOLIC BLOOD PRESSURE: 151 MMHG | HEIGHT: 63 IN | WEIGHT: 179 LBS | BODY MASS INDEX: 31.71 KG/M2

## 2024-09-04 DIAGNOSIS — N18.4 CONTROLLED TYPE 2 DIABETES MELLITUS WITH STAGE 4 CHRONIC KIDNEY DISEASE, WITHOUT LONG-TERM CURRENT USE OF INSULIN (MULTI): ICD-10-CM

## 2024-09-04 DIAGNOSIS — N18.4 CHRONIC KIDNEY DISEASE WITH ACTIVE MEDICAL MANAGEMENT WITHOUT DIALYSIS, STAGE 4 (SEVERE) (MULTI): Primary | ICD-10-CM

## 2024-09-04 DIAGNOSIS — I10 PRIMARY HYPERTENSION: ICD-10-CM

## 2024-09-04 DIAGNOSIS — E11.22 CONTROLLED TYPE 2 DIABETES MELLITUS WITH STAGE 4 CHRONIC KIDNEY DISEASE, WITHOUT LONG-TERM CURRENT USE OF INSULIN (MULTI): ICD-10-CM

## 2024-09-04 DIAGNOSIS — E11.9 TYPE 2 DIABETES MELLITUS WITHOUT COMPLICATION, WITHOUT LONG-TERM CURRENT USE OF INSULIN (MULTI): ICD-10-CM

## 2024-09-04 PROCEDURE — G2211 COMPLEX E/M VISIT ADD ON: HCPCS | Performed by: INTERNAL MEDICINE

## 2024-09-04 PROCEDURE — 1160F RVW MEDS BY RX/DR IN RCRD: CPT | Performed by: INTERNAL MEDICINE

## 2024-09-04 PROCEDURE — 99214 OFFICE O/P EST MOD 30 MIN: CPT | Performed by: INTERNAL MEDICINE

## 2024-09-04 PROCEDURE — 1159F MED LIST DOCD IN RCRD: CPT | Performed by: INTERNAL MEDICINE

## 2024-09-04 PROCEDURE — 1036F TOBACCO NON-USER: CPT | Performed by: INTERNAL MEDICINE

## 2024-09-04 PROCEDURE — 3077F SYST BP >= 140 MM HG: CPT | Performed by: INTERNAL MEDICINE

## 2024-09-04 PROCEDURE — 1157F ADVNC CARE PLAN IN RCRD: CPT | Performed by: INTERNAL MEDICINE

## 2024-09-04 PROCEDURE — 1126F AMNT PAIN NOTED NONE PRSNT: CPT | Performed by: INTERNAL MEDICINE

## 2024-09-04 PROCEDURE — 3078F DIAST BP <80 MM HG: CPT | Performed by: INTERNAL MEDICINE

## 2024-09-04 RX ORDER — DOXAZOSIN 4 MG/1
4 TABLET ORAL NIGHTLY
Qty: 30 TABLET | Refills: 1 | Status: SHIPPED | OUTPATIENT
Start: 2024-09-04 | End: 2024-12-03

## 2024-09-04 RX ORDER — GLYBURIDE 2.5 MG/1
2.5 TABLET ORAL
Qty: 90 TABLET | Refills: 3 | Status: SHIPPED | OUTPATIENT
Start: 2024-09-04

## 2024-09-04 ASSESSMENT — ENCOUNTER SYMPTOMS
DIZZINESS: 0
SORE THROAT: 0
DYSURIA: 0
LIGHT-HEADEDNESS: 0
ARTHRALGIAS: 0
CONSTIPATION: 0
VOMITING: 0
COUGH: 0
TROUBLE SWALLOWING: 0
NAUSEA: 0
FATIGUE: 0
PALPITATIONS: 0
SHORTNESS OF BREATH: 0
DIARRHEA: 0
FREQUENCY: 0
ABDOMINAL PAIN: 0
FEVER: 0

## 2024-09-04 ASSESSMENT — PAIN SCALES - GENERAL: PAINLEVEL: 0-NO PAIN

## 2024-09-04 NOTE — ASSESSMENT & PLAN NOTE
Patient had concerns about her ankle edema.  I explained that it may be from fluid overload but may also be related directly to some of the side effects of her medications.  Patient does have an appointment with her nephrologist in December which I encouraged her to keep.  Patient was going to stop her alendronate because she saw that it had lowered sodium and it and was worried about the leg edema.  I explained that just because it has lowered sodium does not mean it is a salt and strongly encouraged her to not stop taking the medication until she spoke to her kidney specialist.  Patient agreed and will continue taking all her meds until she sees the nephrologist

## 2024-09-04 NOTE — PATIENT INSTRUCTIONS
Increase doxazosin to 4 mg once a day    Follow up Dr Vance in 1 month for HTN 30 min appointment    Please check BP 2 to 3 times a week and bring readings with you to next appointment.

## 2024-09-04 NOTE — ASSESSMENT & PLAN NOTE
Patient's blood pressure still not under good control and she is having intermittent ankle edema.  Part of this may be from the heat but part of it may be the medications including the doxazosin and the amlodipine.  I told the patient that if her ankle swelled up again to give us a call but in the meantime the plan would be to continue the amlodipine 10 mg dose and increase the doxazosin from 2 mg to 4 mg daily.  Patient states understanding and will do so.  Patient will continue metoprolol tartrate 100 mg twice daily

## 2024-09-04 NOTE — PROGRESS NOTES
Subjective   Patient ID: Hyacinth Crisostomo is a 84 y.o. female who presents for 1 month re for HTN, diabetes, and cholesterol management.    Patient is here today for follow-up on hypertension.  She brings in multiple blood pressure readings from home and they range anywhere from 136/69 all the way up to 165/78.  They are extremely labile however they are all elevated.  Patient sees her kidney doctor in December and has questions about stopping some medications but I encouraged her to continue all her current medications until she sees her kidney specialist.  In the meantime we will continue to adjust her meds and try to get her blood pressure under better control.        Review of Systems   Constitutional:  Negative for fatigue and fever.   HENT:  Negative for sore throat and trouble swallowing.    Eyes:  Negative for visual disturbance.   Respiratory:  Negative for cough and shortness of breath.    Cardiovascular:  Positive for leg swelling. Negative for chest pain and palpitations.   Gastrointestinal:  Negative for abdominal pain, constipation, diarrhea, nausea and vomiting.   Genitourinary:  Negative for dysuria and frequency.   Musculoskeletal:  Negative for arthralgias.   Skin:  Negative for rash.   Neurological:  Negative for dizziness and light-headedness.       Objective   Medication Documentation Review Audit       Reviewed by Lucita Vance MD (Physician) on 09/04/24 at 1359      Medication Order Taking? Sig Documenting Provider Last Dose Status   alendronate (Fosamax) 70 mg tablet [id number] No Take 1 tablet (70 mg) by mouth 1 (one) time per week. Jeannine Landa, DO Taking Active   amLODIPine (Norvasc) 10 mg tablet [id number] No Take 1 tablet (10 mg) by mouth once daily. Lucita Vance MD Taking Active   atorvastatin (Lipitor) 10 mg tablet [id number] No Take 1 tablet (10 mg) by mouth once daily. as directed Lucita Vance MD Taking Active   calcium carbonate-vit D3-min 600 mg calcium- 400 unit tablet  "[id number] No Take 1 tablet by mouth once daily. Historical Provider, MD Taking Active   cholecalciferol (Vitamin D-3) 10 MCG (400 UNIT) tablet [id number] No Take 1 tablet (10 mcg) by mouth once daily. Historical Provider, MD Taking Active   Combigan 0.2-0.5 % ophthalmic solution [id number] No Administer into affected eye(s). Historical Provider, MD Taking Active   doxazosin (Cardura) 2 mg tablet [id number]  TAKE 1 TABLET (2 MG) BY MOUTH ONCE DAILY AT BEDTIME. Lucita Vance MD  Active   glyBURIDE (Diabeta) 2.5 mg tablet [id number]  Take 1 tablet (2.5 mg) by mouth 2 times daily (morning and late afternoon). Lucita Vance MD  Active   lancets misc [id number]  Dispense One touch Delico lancets #100 with 3 refills to be used once a day and PRN if symptomatic Lucita Vance MD  Active   Lantus Solostar U-100 Insulin 100 unit/mL (3 mL) pen [id number] No Inject 15 Units under the skin once daily at bedtime. Take as directed per insulin instructions. DIANA Peterson DNP Taking Active   metoprolol tartrate (Lopressor) 100 mg tablet [id number]  Take 1 tablet (100 mg) by mouth 2 times a day. Lucita Vance MD  Active   OneTouch Ultra Test strip [id number] No USE 1 STRIP DAILY AND AS NEEDED IF SYMPTOMATIC Lucita Vance MD Taking Active   pen needle, diabetic (BD Ultra-Fine Short Pen Needle) 31 gauge x 5/16\" needle [id number] No USE AS DIRECTED. DIANA Peterson DNP Taking Active   prednisoLONE acetate (Pred-Forte) 1 % ophthalmic suspension [id number] No INSTILL 1 DROP INTO LEFT EYE ONCE A DAY Historical Provider, MD Taking Active   SITagliptin phosphate (Januvia) 100 mg tablet [id number] No Take 1 tablet (100 mg) by mouth once daily. DIANA Peterson DNP Taking Active   travoprost (Travatan Z) 0.004 % drops ophthalmic solution [id number] No Administer 1 drop into both eyes once daily at bedtime. Historical Provider, MD Taking Active                  Allergies   Allergen Reactions    Cpm-Pseudoephed-Acetaminophen " "Hives       /76   Pulse 61   Ht 1.6 m (5' 3\")   Wt 81.2 kg (179 lb)   SpO2 96%   BMI 31.71 kg/m²     Physical Exam  Constitutional:       Appearance: Normal appearance.   HENT:      Head: Normocephalic and atraumatic.      Nose: Nose normal.   Eyes:      Extraocular Movements: Extraocular movements intact.      Pupils: Pupils are equal, round, and reactive to light.   Cardiovascular:      Rate and Rhythm: Normal rate and regular rhythm.   Pulmonary:      Breath sounds: Normal breath sounds.   Abdominal:      General: Abdomen is flat. Bowel sounds are normal.      Palpations: Abdomen is soft.   Musculoskeletal:      Right lower leg: Edema present.      Left lower leg: Edema present.   Neurological:      Mental Status: She is alert.           Assessment/Plan   Problem List Items Addressed This Visit       Chronic kidney disease with active medical management without dialysis, stage 4 (severe) (Multi) - Primary     Patient had concerns about her ankle edema.  I explained that it may be from fluid overload but may also be related directly to some of the side effects of her medications.  Patient does have an appointment with her nephrologist in December which I encouraged her to keep.  Patient was going to stop her alendronate because she saw that it had lowered sodium and it and was worried about the leg edema.  I explained that just because it has lowered sodium does not mean it is a salt and strongly encouraged her to not stop taking the medication until she spoke to her kidney specialist.  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  Part of this may be from the heat but part of it may be the medications including the doxazosin and the amlodipine.  I told the patient that if her " ankle swelled up again to give us a call but in the meantime the plan would be to continue the amlodipine 10 mg dose and increase the doxazosin from 2 mg to 4 mg daily.  Patient states understanding and will do so.  Patient will continue metoprolol tartrate 100 mg twice daily         Relevant Medications    doxazosin (Cardura) 4 mg tablet     Other Visit Diagnoses       Type 2 diabetes mellitus without complication, without long-term current use of insulin (Multi)        Relevant Medications    glyBURIDE (Diabeta) 2.5 mg tablet                   It has been a pleasure seeing you.  Lucita Vance MD

## 2024-09-26 DIAGNOSIS — I10 PRIMARY HYPERTENSION: ICD-10-CM

## 2024-09-26 RX ORDER — DOXAZOSIN 4 MG/1
4 TABLET ORAL NIGHTLY
Qty: 90 TABLET | Refills: 1 | OUTPATIENT
Start: 2024-09-26 | End: 2024-12-25

## 2024-10-07 ENCOUNTER — APPOINTMENT (OUTPATIENT)
Dept: PRIMARY CARE | Facility: CLINIC | Age: 84
End: 2024-10-07
Payer: MEDICARE

## 2024-10-07 ENCOUNTER — LAB (OUTPATIENT)
Dept: LAB | Facility: LAB | Age: 84
End: 2024-10-07
Payer: MEDICARE

## 2024-10-07 VITALS
OXYGEN SATURATION: 95 % | BODY MASS INDEX: 31.89 KG/M2 | HEART RATE: 71 BPM | WEIGHT: 180 LBS | SYSTOLIC BLOOD PRESSURE: 159 MMHG | HEIGHT: 63 IN | DIASTOLIC BLOOD PRESSURE: 63 MMHG

## 2024-10-07 DIAGNOSIS — E87.5 HYPERKALEMIA: ICD-10-CM

## 2024-10-07 DIAGNOSIS — Z23 FLU VACCINE NEED: ICD-10-CM

## 2024-10-07 DIAGNOSIS — E78.00 HYPERCHOLESTEROLEMIA: ICD-10-CM

## 2024-10-07 DIAGNOSIS — Z79.4 TYPE 2 DIABETES MELLITUS WITHOUT COMPLICATION, WITH LONG-TERM CURRENT USE OF INSULIN (MULTI): ICD-10-CM

## 2024-10-07 DIAGNOSIS — I10 PRIMARY HYPERTENSION: ICD-10-CM

## 2024-10-07 DIAGNOSIS — N18.4 CHRONIC KIDNEY DISEASE WITH ACTIVE MEDICAL MANAGEMENT WITHOUT DIALYSIS, STAGE 4 (SEVERE) (MULTI): ICD-10-CM

## 2024-10-07 DIAGNOSIS — H90.3 BILATERAL SENSORINEURAL HEARING LOSS: ICD-10-CM

## 2024-10-07 DIAGNOSIS — N18.4 CONTROLLED TYPE 2 DIABETES MELLITUS WITH STAGE 4 CHRONIC KIDNEY DISEASE, WITHOUT LONG-TERM CURRENT USE OF INSULIN (MULTI): ICD-10-CM

## 2024-10-07 DIAGNOSIS — E11.9 TYPE 2 DIABETES MELLITUS WITHOUT COMPLICATION, WITH LONG-TERM CURRENT USE OF INSULIN (MULTI): ICD-10-CM

## 2024-10-07 DIAGNOSIS — E11.22 CONTROLLED TYPE 2 DIABETES MELLITUS WITH STAGE 4 CHRONIC KIDNEY DISEASE, WITHOUT LONG-TERM CURRENT USE OF INSULIN (MULTI): ICD-10-CM

## 2024-10-07 DIAGNOSIS — M81.0 AGE-RELATED OSTEOPOROSIS WITHOUT CURRENT PATHOLOGICAL FRACTURE: ICD-10-CM

## 2024-10-07 DIAGNOSIS — N18.4 CHRONIC KIDNEY DISEASE WITH ACTIVE MEDICAL MANAGEMENT WITHOUT DIALYSIS, STAGE 4 (SEVERE) (MULTI): Primary | ICD-10-CM

## 2024-10-07 PROBLEM — R60.9 DEPENDENT EDEMA: Status: ACTIVE | Noted: 2024-10-07

## 2024-10-07 LAB
ANION GAP SERPL CALC-SCNC: 13 MMOL/L (ref 10–20)
BUN SERPL-MCNC: 28 MG/DL (ref 6–23)
CALCIUM SERPL-MCNC: 9.4 MG/DL (ref 8.6–10.6)
CHLORIDE SERPL-SCNC: 105 MMOL/L (ref 98–107)
CO2 SERPL-SCNC: 28 MMOL/L (ref 21–32)
CREAT SERPL-MCNC: 1.74 MG/DL (ref 0.5–1.05)
EGFRCR SERPLBLD CKD-EPI 2021: 29 ML/MIN/1.73M*2
EST. AVERAGE GLUCOSE BLD GHB EST-MCNC: 151 MG/DL
GLUCOSE SERPL-MCNC: 239 MG/DL (ref 74–99)
HBA1C MFR BLD: 6.9 %
POTASSIUM SERPL-SCNC: 4.3 MMOL/L (ref 3.5–5.3)
SODIUM SERPL-SCNC: 142 MMOL/L (ref 136–145)

## 2024-10-07 PROCEDURE — 3077F SYST BP >= 140 MM HG: CPT | Performed by: INTERNAL MEDICINE

## 2024-10-07 PROCEDURE — 99214 OFFICE O/P EST MOD 30 MIN: CPT | Performed by: INTERNAL MEDICINE

## 2024-10-07 PROCEDURE — 1126F AMNT PAIN NOTED NONE PRSNT: CPT | Performed by: INTERNAL MEDICINE

## 2024-10-07 PROCEDURE — 83036 HEMOGLOBIN GLYCOSYLATED A1C: CPT

## 2024-10-07 PROCEDURE — 1159F MED LIST DOCD IN RCRD: CPT | Performed by: INTERNAL MEDICINE

## 2024-10-07 PROCEDURE — G0008 ADMIN INFLUENZA VIRUS VAC: HCPCS | Performed by: INTERNAL MEDICINE

## 2024-10-07 PROCEDURE — 36415 COLL VENOUS BLD VENIPUNCTURE: CPT

## 2024-10-07 PROCEDURE — 1036F TOBACCO NON-USER: CPT | Performed by: INTERNAL MEDICINE

## 2024-10-07 PROCEDURE — 80048 BASIC METABOLIC PNL TOTAL CA: CPT

## 2024-10-07 PROCEDURE — 3078F DIAST BP <80 MM HG: CPT | Performed by: INTERNAL MEDICINE

## 2024-10-07 PROCEDURE — G2211 COMPLEX E/M VISIT ADD ON: HCPCS | Performed by: INTERNAL MEDICINE

## 2024-10-07 PROCEDURE — 90662 IIV NO PRSV INCREASED AG IM: CPT | Performed by: INTERNAL MEDICINE

## 2024-10-07 PROCEDURE — 1157F ADVNC CARE PLAN IN RCRD: CPT | Performed by: INTERNAL MEDICINE

## 2024-10-07 RX ORDER — DOXAZOSIN 8 MG/1
8 TABLET ORAL NIGHTLY
Qty: 30 TABLET | Refills: 3 | Status: SHIPPED | OUTPATIENT
Start: 2024-10-07 | End: 2025-01-05

## 2024-10-07 ASSESSMENT — ENCOUNTER SYMPTOMS
PALPITATIONS: 0
ABDOMINAL PAIN: 0
SHORTNESS OF BREATH: 0
DIZZINESS: 0
COUGH: 0
HEADACHES: 0
DYSURIA: 0
SORE THROAT: 0
LIGHT-HEADEDNESS: 1
CONSTIPATION: 0
DIARRHEA: 0
ARTHRALGIAS: 0
NUMBNESS: 0
CHEST TIGHTNESS: 0
FREQUENCY: 0
TROUBLE SWALLOWING: 0
VOMITING: 0
NAUSEA: 0
FEVER: 0
FATIGUE: 0

## 2024-10-07 ASSESSMENT — PATIENT HEALTH QUESTIONNAIRE - PHQ9
2. FEELING DOWN, DEPRESSED OR HOPELESS: NOT AT ALL
SUM OF ALL RESPONSES TO PHQ9 QUESTIONS 1 AND 2: 0
1. LITTLE INTEREST OR PLEASURE IN DOING THINGS: NOT AT ALL

## 2024-10-07 ASSESSMENT — PAIN SCALES - GENERAL: PAINLEVEL: 0-NO PAIN

## 2024-10-07 NOTE — ASSESSMENT & PLAN NOTE
Patient's osteoporosis is managed with alendronate and vitamin D supplements. Alendronate started May 2022.  Her first holiday for bisphosphonates will be due in May 2027.

## 2024-10-07 NOTE — ASSESSMENT & PLAN NOTE
T2DM is controlled on glyburide and insulin. A1c was elevated on 6/11 at 7.40. Repeat A1c ordered to be collected today.

## 2024-10-07 NOTE — ASSESSMENT & PLAN NOTE
Resistant HTN treated with amlodipine 10 mg daily, metoprolol 100 mg BID, and doxazosin 4 mg daily. Patient's BP elevated today (159/63) and (148/58 manually). Patient's dose of doxazosin increased from 4 mg to 8mg daily. Patient scheduled for 30 minute follow up appointment in one month.

## 2024-10-07 NOTE — ASSESSMENT & PLAN NOTE
Patient has bilateral lower extremity edema possibly related to the amlodipine. She is asymptomatic.

## 2024-10-07 NOTE — PATIENT INSTRUCTIONS
Get blood work today    Increase doxazosin to 8mg a day    Follow up Dr Vance in 1 month for HtN 30 min appointment

## 2024-10-07 NOTE — PROGRESS NOTES
Subjective   Patient ID: Hyacinth Crisostomo is a 84 y.o. female who presents with no acute complaints for a regular one month appointment for resistant HTN.    Hyacinth Crisostomo is an 84 year old patient presenting for a one month follow up appointment for resistant hypertension. Patient has been monitoring her blood pressure at home as instructed. Her BP readings show persistent HTN x 1 month. States she has not experienced any chest pain, palpitations, or SOB. Patient confirms she did feel lightheaded on one occasion about one month ago; she denies syncope. She took her blood pressure at this time (117/69); she states she was washing the dishes at this time and had not eaten suggesting possible hypoglycemia. PMH significant for primary hypertension, stage 4 CKD, T2DM, bilateral sensorineural hearing loss, osteoporosis, hyperkalemia, and hypercholesteremia.         Review of Systems   Constitutional:  Negative for fatigue and fever.   HENT:  Negative for sore throat and trouble swallowing.    Eyes:  Negative for visual disturbance.   Respiratory:  Negative for cough, chest tightness and shortness of breath.    Cardiovascular:  Positive for leg swelling. Negative for chest pain and palpitations.   Gastrointestinal:  Negative for abdominal pain, constipation, diarrhea, nausea and vomiting.   Genitourinary:  Negative for dysuria and frequency.   Musculoskeletal:  Negative for arthralgias.   Skin:  Negative for rash.   Neurological:  Positive for light-headedness. Negative for dizziness, numbness and headaches.       Objective   Medication Documentation Review Audit       Reviewed by Nayeli Rain (Student Physician Assistant) on 10/07/24 at 1458      Medication Order Taking? Sig Documenting Provider Last Dose Status   alendronate (Fosamax) 70 mg tablet [id number] No Take 1 tablet (70 mg) by mouth 1 (one) time per week. Jeannine Landa, DO Taking Active   amLODIPine (Norvasc) 10 mg tablet [id number] No Take 1 tablet (10 mg) by  "mouth once daily. Lucita Vance MD Taking Active   atorvastatin (Lipitor) 10 mg tablet [id number] No Take 1 tablet (10 mg) by mouth once daily. as directed Lucita Vance MD Taking Active   calcium carbonate-vit D3-min 600 mg calcium- 400 unit tablet [id number] No Take 1 tablet by mouth once daily. Historical Provider, MD Taking Active   cholecalciferol (Vitamin D-3) 10 MCG (400 UNIT) tablet [id number] No Take 1 tablet (10 mcg) by mouth once daily. Historical Provider, MD Taking Active   Combigan 0.2-0.5 % ophthalmic solution [id number] No Administer into affected eye(s). Historical Provider, MD Taking Active   doxazosin (Cardura) 4 mg tablet [id number]  Take 1 tablet (4 mg) by mouth once daily at bedtime. Lucita Vance MD  Active   glyBURIDE (Diabeta) 2.5 mg tablet [id number]  Take 1 tablet (2.5 mg) by mouth 2 times daily (morning and late afternoon). Lucita Vance MD  Active   lancets misc [id number]  Dispense One touch Delico lancets #100 with 3 refills to be used once a day and PRN if symptomatic Lucita Vance MD  Active   Lantus Solostar U-100 Insulin 100 unit/mL (3 mL) pen [id number] No Inject 15 Units under the skin once daily at bedtime. Take as directed per insulin instructions. DIANA Peterson, YORDAN Taking Active   metoprolol tartrate (Lopressor) 100 mg tablet [id number]  Take 1 tablet (100 mg) by mouth 2 times a day. Lucita Vance MD  Active   OneTouch Ultra Test strip [id number] No USE 1 STRIP DAILY AND AS NEEDED IF SYMPTOMATIC Lucita Vance MD Taking Active   pen needle, diabetic (BD Ultra-Fine Short Pen Needle) 31 gauge x 5/16\" needle [id number] No USE AS DIRECTED. DIANA Peterson, YORDAN Taking Active   prednisoLONE acetate (Pred-Forte) 1 % ophthalmic suspension [id number] No INSTILL 1 DROP INTO LEFT EYE ONCE A DAY Historical Provider, MD Taking Active   SITagliptin phosphate (Januvia) 100 mg tablet [id number] No Take 1 tablet (100 mg) by mouth once daily. Andreina M Young, APRN-CNP, DNP Taking " "Active   travoprost (Travatan Z) 0.004 % drops ophthalmic solution [id number] No Administer 1 drop into both eyes once daily at bedtime. Historical Provider, MD Taking Active                  Allergies   Allergen Reactions    Cpm-Pseudoephed-Acetaminophen Hives       /63   Pulse 71   Ht 1.6 m (5' 3\")   Wt 81.6 kg (180 lb)   SpO2 95%   BMI 31.89 kg/m²     Physical Exam  Constitutional:       General: She is not in acute distress.     Appearance: Normal appearance. She is normal weight. She is not ill-appearing, toxic-appearing or diaphoretic.   HENT:      Head: Normocephalic and atraumatic.      Nose: Nose normal.   Eyes:      Extraocular Movements: Extraocular movements intact.      Pupils: Pupils are equal, round, and reactive to light.   Cardiovascular:      Rate and Rhythm: Normal rate and regular rhythm.      Pulses: Normal pulses.      Heart sounds: Normal heart sounds. No murmur heard.     No friction rub. No gallop.   Pulmonary:      Effort: Pulmonary effort is normal. No respiratory distress.      Breath sounds: Normal breath sounds. No stridor. No wheezing, rhonchi or rales.   Chest:      Chest wall: No tenderness.   Abdominal:      General: Abdomen is flat. Bowel sounds are normal.      Palpations: Abdomen is soft.   Musculoskeletal:         General: No tenderness, deformity or signs of injury.      Right lower leg: Edema present.      Left lower leg: Edema present.   Skin:     Coloration: Skin is not jaundiced or pale.      Findings: No bruising or erythema.   Neurological:      General: No focal deficit present.      Mental Status: She is alert and oriented to person, place, and time.      Cranial Nerves: No cranial nerve deficit.   Psychiatric:         Mood and Affect: Mood normal.         Behavior: Behavior normal.           Assessment/Plan   Problem List Items Addressed This Visit       Age related osteoporosis     Patient's osteoporosis is managed with alendronate and vitamin D supplements. " Alendronate started May 2022.  Her first holiday for bisphosphonates will be due in May 2027.          Bilateral sensorineural hearing loss    Chronic kidney disease with active medical management without dialysis, stage 4 (severe) (Multi) - Primary     BMP ordered to be collected today. Patient sees nephrologist for ongoing stage 4 CKD.          Relevant Orders    Basic metabolic panel    Controlled diabetes mellitus with chronic kidney disease (Multi)     T2DM is controlled on glyburide and insulin. A1c was elevated on 6/11 at 7.40. Repeat A1c ordered to be collected today.          Hypercholesterolemia     Patient's hypercholesteremia is managed on atorvastatin.          Hyperkalemia     BMP ordered to be collected today. Patient's potassium was normal at 5.0 on 6/19/24.          Hypertension     Resistant HTN treated with amlodipine 10 mg daily, metoprolol 100 mg BID, and doxazosin 4 mg daily. Patient's BP elevated today (159/63) and (148/58 manually). Patient's dose of doxazosin increased from 4 mg to 8mg daily. Patient scheduled for 30 minute follow up appointment in one month.         Relevant Medications    doxazosin (Cardura) 8 mg tablet     Other Visit Diagnoses       Flu vaccine need        Relevant Orders    Flu vaccine, trivalent, preservative free, HIGH-DOSE, age 65y+ (Fluzone) (Completed)    Type 2 diabetes mellitus without complication, with long-term current use of insulin (Multi)        Relevant Orders    Hemoglobin A1C            This note or encounter for this patient visit included a student.  I have independently interviewed the patient, performed a physical exam and performed my own assessment and plan and orders.         It has been a pleasure seeing you.  Lucita Vance MD

## 2024-10-28 ENCOUNTER — APPOINTMENT (OUTPATIENT)
Dept: PRIMARY CARE | Facility: CLINIC | Age: 84
End: 2024-10-28
Payer: MEDICARE

## 2024-10-28 VITALS
BODY MASS INDEX: 31.82 KG/M2 | DIASTOLIC BLOOD PRESSURE: 75 MMHG | HEIGHT: 63 IN | HEART RATE: 61 BPM | WEIGHT: 179.6 LBS | SYSTOLIC BLOOD PRESSURE: 155 MMHG | OXYGEN SATURATION: 94 %

## 2024-10-28 DIAGNOSIS — I1A.0 RESISTANT HYPERTENSION: Primary | ICD-10-CM

## 2024-10-28 DIAGNOSIS — I10 PRIMARY HYPERTENSION: ICD-10-CM

## 2024-10-28 DIAGNOSIS — R60.9 DEPENDENT EDEMA: ICD-10-CM

## 2024-10-28 DIAGNOSIS — I10 ESSENTIAL (PRIMARY) HYPERTENSION: ICD-10-CM

## 2024-10-28 PROCEDURE — 3078F DIAST BP <80 MM HG: CPT | Performed by: INTERNAL MEDICINE

## 2024-10-28 PROCEDURE — 1157F ADVNC CARE PLAN IN RCRD: CPT | Performed by: INTERNAL MEDICINE

## 2024-10-28 PROCEDURE — 99213 OFFICE O/P EST LOW 20 MIN: CPT | Performed by: INTERNAL MEDICINE

## 2024-10-28 PROCEDURE — 1159F MED LIST DOCD IN RCRD: CPT | Performed by: INTERNAL MEDICINE

## 2024-10-28 PROCEDURE — 1160F RVW MEDS BY RX/DR IN RCRD: CPT | Performed by: INTERNAL MEDICINE

## 2024-10-28 PROCEDURE — 3077F SYST BP >= 140 MM HG: CPT | Performed by: INTERNAL MEDICINE

## 2024-10-28 PROCEDURE — 1126F AMNT PAIN NOTED NONE PRSNT: CPT | Performed by: INTERNAL MEDICINE

## 2024-10-28 PROCEDURE — 1036F TOBACCO NON-USER: CPT | Performed by: INTERNAL MEDICINE

## 2024-10-28 RX ORDER — DOXAZOSIN 8 MG/1
16 TABLET ORAL NIGHTLY
Qty: 60 TABLET | Refills: 3 | Status: SHIPPED | OUTPATIENT
Start: 2024-10-28 | End: 2025-01-26

## 2024-10-28 ASSESSMENT — ENCOUNTER SYMPTOMS
DIARRHEA: 0
ABDOMINAL PAIN: 0
VOMITING: 0
FREQUENCY: 0
ARTHRALGIAS: 0
DIZZINESS: 0
NAUSEA: 0
SHORTNESS OF BREATH: 0
SORE THROAT: 0
TROUBLE SWALLOWING: 0
PALPITATIONS: 0
CONSTIPATION: 0
DYSURIA: 0
FATIGUE: 0
COUGH: 0
FEVER: 0
LIGHT-HEADEDNESS: 0

## 2024-10-28 ASSESSMENT — PAIN SCALES - GENERAL: PAINLEVEL_OUTOF10: 0-NO PAIN

## 2024-11-13 ENCOUNTER — HOSPITAL ENCOUNTER (OUTPATIENT)
Dept: VASCULAR MEDICINE | Facility: CLINIC | Age: 84
Discharge: HOME | End: 2024-11-13
Payer: MEDICARE

## 2024-11-13 DIAGNOSIS — I10 ESSENTIAL (PRIMARY) HYPERTENSION: ICD-10-CM

## 2024-11-13 DIAGNOSIS — I1A.0 RESISTANT HYPERTENSION: ICD-10-CM

## 2024-11-13 PROCEDURE — 93975 VASCULAR STUDY: CPT | Performed by: INTERNAL MEDICINE

## 2024-11-13 PROCEDURE — 93975 VASCULAR STUDY: CPT

## 2024-11-14 ENCOUNTER — TELEPHONE (OUTPATIENT)
Dept: PRIMARY CARE | Facility: CLINIC | Age: 84
End: 2024-11-14
Payer: MEDICARE

## 2024-11-14 NOTE — TELEPHONE ENCOUNTER
----- Message from Lucita Vance sent at 11/13/2024  4:42 PM EST -----  Please notify patient her renal artery ultrasounds are normal and are not the cause of her hypertension.

## 2024-11-20 DIAGNOSIS — I10 PRIMARY HYPERTENSION: ICD-10-CM

## 2024-11-21 RX ORDER — DOXAZOSIN 2 MG/1
2 TABLET ORAL NIGHTLY
Qty: 90 TABLET | Refills: 0 | OUTPATIENT
Start: 2024-11-21 | End: 2025-02-19

## 2024-11-22 DIAGNOSIS — I10 PRIMARY HYPERTENSION: ICD-10-CM

## 2024-11-22 RX ORDER — DOXAZOSIN 2 MG/1
2 TABLET ORAL NIGHTLY
Qty: 90 TABLET | Refills: 0 | OUTPATIENT
Start: 2024-11-22 | End: 2025-02-20

## 2024-11-25 ENCOUNTER — APPOINTMENT (OUTPATIENT)
Dept: PRIMARY CARE | Facility: CLINIC | Age: 84
End: 2024-11-25
Payer: MEDICARE

## 2024-11-25 VITALS
HEIGHT: 63 IN | HEART RATE: 66 BPM | WEIGHT: 138.8 LBS | DIASTOLIC BLOOD PRESSURE: 87 MMHG | SYSTOLIC BLOOD PRESSURE: 152 MMHG | OXYGEN SATURATION: 94 % | BODY MASS INDEX: 24.59 KG/M2

## 2024-11-25 DIAGNOSIS — H61.23 BILATERAL IMPACTED CERUMEN: ICD-10-CM

## 2024-11-25 DIAGNOSIS — I10 PRIMARY HYPERTENSION: Primary | ICD-10-CM

## 2024-11-25 PROCEDURE — 1036F TOBACCO NON-USER: CPT | Performed by: INTERNAL MEDICINE

## 2024-11-25 PROCEDURE — 1160F RVW MEDS BY RX/DR IN RCRD: CPT | Performed by: INTERNAL MEDICINE

## 2024-11-25 PROCEDURE — 99213 OFFICE O/P EST LOW 20 MIN: CPT | Performed by: INTERNAL MEDICINE

## 2024-11-25 PROCEDURE — 3077F SYST BP >= 140 MM HG: CPT | Performed by: INTERNAL MEDICINE

## 2024-11-25 PROCEDURE — 3079F DIAST BP 80-89 MM HG: CPT | Performed by: INTERNAL MEDICINE

## 2024-11-25 PROCEDURE — 1157F ADVNC CARE PLAN IN RCRD: CPT | Performed by: INTERNAL MEDICINE

## 2024-11-25 PROCEDURE — 1159F MED LIST DOCD IN RCRD: CPT | Performed by: INTERNAL MEDICINE

## 2024-11-25 PROCEDURE — 1126F AMNT PAIN NOTED NONE PRSNT: CPT | Performed by: INTERNAL MEDICINE

## 2024-11-25 RX ORDER — HYDRALAZINE HYDROCHLORIDE 25 MG/1
25 TABLET, FILM COATED ORAL 2 TIMES DAILY
Qty: 60 TABLET | Refills: 2 | Status: SHIPPED | OUTPATIENT
Start: 2024-11-25 | End: 2025-11-25

## 2024-11-25 ASSESSMENT — ENCOUNTER SYMPTOMS
TROUBLE SWALLOWING: 0
FREQUENCY: 0
VOMITING: 0
NAUSEA: 0
DIZZINESS: 0
LIGHT-HEADEDNESS: 0
ARTHRALGIAS: 0
FATIGUE: 0
FEVER: 0
DIARRHEA: 0
CONSTIPATION: 0
COUGH: 0
PALPITATIONS: 0
DYSURIA: 0
SHORTNESS OF BREATH: 0
SORE THROAT: 0
ABDOMINAL PAIN: 0

## 2024-11-25 ASSESSMENT — PAIN SCALES - GENERAL: PAINLEVEL_OUTOF10: 0-NO PAIN

## 2024-11-25 NOTE — PROGRESS NOTES
Subjective   Patient ID: Hyacinth Crisostomo is a 84 y.o. female who presents for 1 month follow up for HTN management.    Patient is here for 1 month follow-up on hypertension.  She brings in multiple readings from home and all but 1 are too high.  On November 19 at 5:35 PM she was feeling a little lightheaded checked her blood pressure and was 127/68 otherwise her blood pressure readings are consistently in the 130s up to 160s systolic  Nephrology is seeing the patient and she does have an appointment with them in about 2 weeks.  Currently she is unable to take ACE inhibitor's because of the hyper kalemia and since it was stopped we have not been able to get her blood pressure under good control.        Review of Systems   Constitutional:  Negative for fatigue and fever.   HENT:  Negative for sore throat and trouble swallowing.    Eyes:  Negative for visual disturbance.   Respiratory:  Negative for cough and shortness of breath.    Cardiovascular:  Negative for chest pain, palpitations and leg swelling.   Gastrointestinal:  Negative for abdominal pain, constipation, diarrhea, nausea and vomiting.   Genitourinary:  Negative for dysuria and frequency.   Musculoskeletal:  Negative for arthralgias.   Skin:  Negative for rash.   Neurological:  Negative for dizziness and light-headedness.       Objective   Medication Documentation Review Audit       Reviewed by Lucita Vance MD (Physician) on 11/25/24 at 1149      Medication Order Taking? Sig Documenting Provider Last Dose Status   alendronate (Fosamax) 70 mg tablet [id number] No Take 1 tablet (70 mg) by mouth 1 (one) time per week. Jeannine Landa,  Taking Active   amLODIPine (Norvasc) 10 mg tablet [id number] No Take 1 tablet (10 mg) by mouth once daily. Lucita Vance MD Taking Active   atorvastatin (Lipitor) 10 mg tablet [id number] No Take 1 tablet (10 mg) by mouth once daily. as directed Lucita Vance MD Taking Active   calcium carbonate-vit D3-min 600 mg calcium-  "400 unit tablet [id number] No Take 1 tablet by mouth once daily. Historical Provider, MD Taking Active   cholecalciferol (Vitamin D-3) 10 MCG (400 UNIT) tablet [id number] No Take 1 tablet (10 mcg) by mouth once daily. Historical Provider, MD Taking Active   Combigan 0.2-0.5 % ophthalmic solution [id number] No Administer into affected eye(s). Historical Provider, MD Taking Active   doxazosin (Cardura) 8 mg tablet [id number]  Take 2 tablets (16 mg) by mouth once daily at bedtime. Lucita Vance MD  Active   glyBURIDE (Diabeta) 2.5 mg tablet [id number]  Take 1 tablet (2.5 mg) by mouth 2 times daily (morning and late afternoon). Lucita Vance MD  Active   lancets misc [id number]  Dispense One touch Delico lancets #100 with 3 refills to be used once a day and PRN if symptomatic Lucita Vance MD  Active   Lantus Solostar U-100 Insulin 100 unit/mL (3 mL) pen [id number] No Inject 15 Units under the skin once daily at bedtime. Take as directed per insulin instructions. DIANA Peterson DNP Taking Active   metoprolol tartrate (Lopressor) 100 mg tablet [id number]  Take 1 tablet (100 mg) by mouth 2 times a day. Lucita Vance MD  Active   OneTouch Ultra Test strip [id number] No USE 1 STRIP DAILY AND AS NEEDED IF SYMPTOMATIC Lucita Vance MD Taking Active   pen needle, diabetic (BD Ultra-Fine Short Pen Needle) 31 gauge x 5/16\" needle [id number] No USE AS DIRECTED. DIANA Peterson DNP Taking Active   prednisoLONE acetate (Pred-Forte) 1 % ophthalmic suspension [id number] No INSTILL 1 DROP INTO LEFT EYE ONCE A DAY Historical Provider, MD Taking Active   SITagliptin phosphate (Januvia) 100 mg tablet [id number] No Take 1 tablet (100 mg) by mouth once daily. DAINA Peterson DNP Taking Active   travoprost (Travatan Z) 0.004 % drops ophthalmic solution [id number] No Administer 1 drop into both eyes once daily at bedtime. Historical Provider, MD Taking Active                  Allergies   Allergen Reactions    " "Cpm-Pseudoephed-Acetaminophen Hives    Lisinopril Other     Hyperkalemia         /87   Pulse 66   Ht 1.6 m (5' 3\")   Wt 63 kg (138 lb 12.8 oz)   SpO2 94%   BMI 24.59 kg/m²     Physical Exam  Constitutional:       Appearance: Normal appearance.   HENT:      Head: Normocephalic and atraumatic.      Nose: Nose normal.   Eyes:      Extraocular Movements: Extraocular movements intact.      Pupils: Pupils are equal, round, and reactive to light.   Cardiovascular:      Rate and Rhythm: Normal rate and regular rhythm.   Pulmonary:      Breath sounds: Normal breath sounds.   Abdominal:      General: Abdomen is flat. Bowel sounds are normal.      Palpations: Abdomen is soft.   Musculoskeletal:      Right lower leg: No edema.      Left lower leg: No edema.   Neurological:      Mental Status: She is alert.           Assessment/Plan   Problem List Items Addressed This Visit       Hypertension - Primary     At this time the patient will continue amlodipine 10 mg daily, doxazosin 16 mg daily and metoprolol tart a rate of 100 mg twice daily.  To this we will add hydralazine 25 mg twice daily and see her back in 1 month         Relevant Medications    hydrALAZINE (Apresoline) 25 mg tablet    Bilateral impacted cerumen     Patient needs both ears irrigated unfortunately is very dark brown hard wax I recommended she use Debrox or another ear cleaning wax remover every night for 1 week at bedtime and make an appointment with me next month and I will irrigate her ears and clean out the wax.  We can recheck her blood pressure at that same appointment                   It has been a pleasure seeing you.  Lucita Vance MD   "

## 2024-11-25 NOTE — ASSESSMENT & PLAN NOTE
Patient needs both ears irrigated unfortunately is very dark brown hard wax I recommended she use Debrox or another ear cleaning wax remover every night for 1 week at bedtime and make an appointment with me next month and I will irrigate her ears and clean out the wax.  We can recheck her blood pressure at that same appointment

## 2024-11-25 NOTE — PATIENT INSTRUCTIONS
Stay on all your current medications and start Hydralazine 25 mg twice a day    Follow up Dr Vance in 1 month 45min appointment  for HTN and ear wax removal    Please use ear wax drops every night for a week before next appointment (ask pharmacist about drops to soften wax)

## 2024-11-25 NOTE — ASSESSMENT & PLAN NOTE
At this time the patient will continue amlodipine 10 mg daily, doxazosin 16 mg daily and metoprolol tart a rate of 100 mg twice daily.  To this we will add hydralazine 25 mg twice daily and see her back in 1 month

## 2024-12-09 ENCOUNTER — LAB (OUTPATIENT)
Dept: LAB | Facility: LAB | Age: 84
End: 2024-12-09
Payer: MEDICARE

## 2024-12-09 DIAGNOSIS — N18.4 CHRONIC KIDNEY DISEASE WITH ACTIVE MEDICAL MANAGEMENT WITHOUT DIALYSIS, STAGE 4 (SEVERE) (MULTI): ICD-10-CM

## 2024-12-09 PROCEDURE — 80048 BASIC METABOLIC PNL TOTAL CA: CPT

## 2024-12-09 PROCEDURE — 36415 COLL VENOUS BLD VENIPUNCTURE: CPT

## 2024-12-10 LAB
ANION GAP SERPL CALC-SCNC: 11 MMOL/L (ref 10–20)
BUN SERPL-MCNC: 33 MG/DL (ref 6–23)
CALCIUM SERPL-MCNC: 8.9 MG/DL (ref 8.6–10.6)
CHLORIDE SERPL-SCNC: 112 MMOL/L (ref 98–107)
CO2 SERPL-SCNC: 24 MMOL/L (ref 21–32)
CREAT SERPL-MCNC: 1.84 MG/DL (ref 0.5–1.05)
EGFRCR SERPLBLD CKD-EPI 2021: 27 ML/MIN/1.73M*2
GLUCOSE SERPL-MCNC: 188 MG/DL (ref 74–99)
POTASSIUM SERPL-SCNC: 4.7 MMOL/L (ref 3.5–5.3)
SODIUM SERPL-SCNC: 142 MMOL/L (ref 136–145)

## 2024-12-12 ENCOUNTER — APPOINTMENT (OUTPATIENT)
Dept: NEPHROLOGY | Facility: CLINIC | Age: 84
End: 2024-12-12
Payer: MEDICARE

## 2024-12-12 VITALS
BODY MASS INDEX: 33.1 KG/M2 | WEIGHT: 186.8 LBS | HEIGHT: 63 IN | DIASTOLIC BLOOD PRESSURE: 58 MMHG | SYSTOLIC BLOOD PRESSURE: 122 MMHG | HEART RATE: 68 BPM

## 2024-12-12 DIAGNOSIS — E11.22 CONTROLLED TYPE 2 DIABETES MELLITUS WITH STAGE 4 CHRONIC KIDNEY DISEASE, WITHOUT LONG-TERM CURRENT USE OF INSULIN (MULTI): ICD-10-CM

## 2024-12-12 DIAGNOSIS — N18.4 CHRONIC KIDNEY DISEASE WITH ACTIVE MEDICAL MANAGEMENT WITHOUT DIALYSIS, STAGE 4 (SEVERE) (MULTI): Primary | ICD-10-CM

## 2024-12-12 DIAGNOSIS — I10 PRIMARY HYPERTENSION: ICD-10-CM

## 2024-12-12 DIAGNOSIS — N18.4 CONTROLLED TYPE 2 DIABETES MELLITUS WITH STAGE 4 CHRONIC KIDNEY DISEASE, WITHOUT LONG-TERM CURRENT USE OF INSULIN (MULTI): ICD-10-CM

## 2024-12-12 DIAGNOSIS — E78.00 HYPERCHOLESTEROLEMIA: ICD-10-CM

## 2024-12-12 PROCEDURE — 1159F MED LIST DOCD IN RCRD: CPT | Performed by: CLINICAL NURSE SPECIALIST

## 2024-12-12 PROCEDURE — 3074F SYST BP LT 130 MM HG: CPT | Performed by: CLINICAL NURSE SPECIALIST

## 2024-12-12 PROCEDURE — 1160F RVW MEDS BY RX/DR IN RCRD: CPT | Performed by: CLINICAL NURSE SPECIALIST

## 2024-12-12 PROCEDURE — 1157F ADVNC CARE PLAN IN RCRD: CPT | Performed by: CLINICAL NURSE SPECIALIST

## 2024-12-12 PROCEDURE — 99213 OFFICE O/P EST LOW 20 MIN: CPT | Performed by: CLINICAL NURSE SPECIALIST

## 2024-12-12 PROCEDURE — 3078F DIAST BP <80 MM HG: CPT | Performed by: CLINICAL NURSE SPECIALIST

## 2024-12-12 PROCEDURE — 1036F TOBACCO NON-USER: CPT | Performed by: CLINICAL NURSE SPECIALIST

## 2024-12-12 ASSESSMENT — ENCOUNTER SYMPTOMS
NEUROLOGICAL NEGATIVE: 1
GASTROINTESTINAL NEGATIVE: 1
RESPIRATORY NEGATIVE: 1
FATIGUE: 1
ENDOCRINE NEGATIVE: 1
MUSCULOSKELETAL NEGATIVE: 1
PSYCHIATRIC NEGATIVE: 1

## 2024-12-12 NOTE — PROGRESS NOTES
Subjective   Patient ID: Hyacinth Crisostomo is a 84 y.o. female who presents for Follow-up (6 month ck/Review labs 12/9).  Patient being seen in follow-up for chronic kidney disease stage IV with history of diabetes and hypertension    Labs reviewed  Glucose 188  Sodium 142, potassium 4.7, chloride 112, bicarb 24  Renal function with a BUN of 33 and creatinine of 1.84, GFR is 27  Last hemoglobin A1c 6.9    She is complaining of increased fatigue, she feels this has been worse over the last 3 weeks when she started taking the new blood pressure medicine  She has had some lower extremity swelling which is improved slightly however she noticed this starting to increase some in August  She denies any lightheadedness or dizziness  She denies any difficulties voiding and gets up every couple hours at night to go to the bathroom          Review of Systems   Constitutional:  Positive for fatigue.   Respiratory: Negative.     Cardiovascular:  Positive for leg swelling.   Gastrointestinal: Negative.    Endocrine: Negative.    Genitourinary: Negative.    Musculoskeletal: Negative.    Skin: Negative.    Neurological: Negative.    Psychiatric/Behavioral: Negative.         Objective   Physical Exam  Vitals reviewed.   Constitutional:       Appearance: Normal appearance.   HENT:      Head: Normocephalic.   Cardiovascular:      Rate and Rhythm: Normal rate and regular rhythm.   Pulmonary:      Effort: Pulmonary effort is normal.      Breath sounds: Normal breath sounds.   Abdominal:      Palpations: Abdomen is soft.   Musculoskeletal:         General: Normal range of motion.      Right lower leg: Edema (Mild) present.      Left lower leg: Edema (Mild) present.   Skin:     General: Skin is warm and dry.   Neurological:      Mental Status: She is alert and oriented to person, place, and time.   Psychiatric:         Mood and Affect: Mood normal.         Behavior: Behavior normal.         Assessment/Plan   Problem List Items Addressed  This Visit             ICD-10-CM    Chronic kidney disease with active medical management without dialysis, stage 4 (severe) (Multi) - Primary N18.4     Creatinine is trending up slightly currently up to 1.84, continues to be in stage IV kidney disease, last hemoglobin A1c 6.9, blood pressure well-controlled in the office today, need to avoid hypotension, not using nephrotoxic medications         Relevant Orders    Basic Metabolic Panel (Completed)    CBC    Vitamin D 25-Hydroxy,Total (for eval of Vitamin D levels)    Comprehensive metabolic panel    Magnesium    Phosphorus    Follow Up In Nephrology    Albumin-Creatinine Ratio, Urine Random    Urinalysis with Reflex Microscopic    Controlled diabetes mellitus with chronic kidney disease (Multi) E11.22    Hypercholesterolemia E78.00    Hypertension I10     Blood pressure is currently good in the office, has been taking her blood pressure at home, in the morning she is usually around 155 systolically and then she drops into the 120s, currently on amlodipine, metoprolol, doxazosin, hydralazine.  She feels like her fatigue has been much worse since she was started on hydralazine, states has been present for about 3 weeks, I have given her permission to hold her hydralazine to see if she notices any improvement in her fatigue, she will take her blood pressure every day and will take these to her next primary care appointment in 6 days, if her blood pressure continues to be elevated off this medicine I did tell her it would need to be restarted as we do need to protect her kidneys from high blood pressure.          CKD IV with baseline creatinine about 1.6-1.9  Diabetes mellitus type 2 for 40 years  Hypertension  Glaucoma  Left vision loss  Dyslipidemia  Slight hyperkalemia  Vitamin D deficiency   Peripheral edema: Slight        Andreina Clemens, SEN-KEMAR, DNP 12/12/24 1:02 PM

## 2024-12-12 NOTE — ASSESSMENT & PLAN NOTE
Blood pressure is currently good in the office, has been taking her blood pressure at home, in the morning she is usually around 155 systolically and then she drops into the 120s, currently on amlodipine, metoprolol, doxazosin, hydralazine.  She feels like her fatigue has been much worse since she was started on hydralazine, states has been present for about 3 weeks, I have given her permission to hold her hydralazine to see if she notices any improvement in her fatigue, she will take her blood pressure every day and will take these to her next primary care appointment in 6 days, if her blood pressure continues to be elevated off this medicine I did tell her it would need to be restarted as we do need to protect her kidneys from high blood pressure.

## 2024-12-12 NOTE — PATIENT INSTRUCTIONS
Hold hydralazine 25 mg twice a day until you see your family doctor.  Goal blood pressure 140/80  Call with how you are feeling and if fatigue has improved

## 2024-12-12 NOTE — ASSESSMENT & PLAN NOTE
Creatinine is trending up slightly currently up to 1.84, continues to be in stage IV kidney disease, last hemoglobin A1c 6.9, blood pressure well-controlled in the office today, need to avoid hypotension, not using nephrotoxic medications

## 2024-12-17 DIAGNOSIS — I10 PRIMARY HYPERTENSION: ICD-10-CM

## 2024-12-20 RX ORDER — HYDRALAZINE HYDROCHLORIDE 25 MG/1
25 TABLET, FILM COATED ORAL 2 TIMES DAILY
Qty: 180 TABLET | Refills: 1 | OUTPATIENT
Start: 2024-12-20

## 2024-12-26 ENCOUNTER — APPOINTMENT (OUTPATIENT)
Dept: PRIMARY CARE | Facility: CLINIC | Age: 84
End: 2024-12-26
Payer: MEDICARE

## 2024-12-26 VITALS
BODY MASS INDEX: 32.04 KG/M2 | HEART RATE: 65 BPM | DIASTOLIC BLOOD PRESSURE: 78 MMHG | WEIGHT: 180.8 LBS | SYSTOLIC BLOOD PRESSURE: 153 MMHG | HEIGHT: 63 IN | OXYGEN SATURATION: 93 %

## 2024-12-26 DIAGNOSIS — I10 PRIMARY HYPERTENSION: ICD-10-CM

## 2024-12-26 DIAGNOSIS — E11.9 TYPE 2 DIABETES MELLITUS WITHOUT COMPLICATION, WITHOUT LONG-TERM CURRENT USE OF INSULIN (MULTI): ICD-10-CM

## 2024-12-26 DIAGNOSIS — H61.23 BILATERAL IMPACTED CERUMEN: Primary | ICD-10-CM

## 2024-12-26 DIAGNOSIS — E78.00 HYPERCHOLESTEROLEMIA: ICD-10-CM

## 2024-12-26 PROCEDURE — 1036F TOBACCO NON-USER: CPT | Performed by: INTERNAL MEDICINE

## 2024-12-26 PROCEDURE — 1159F MED LIST DOCD IN RCRD: CPT | Performed by: INTERNAL MEDICINE

## 2024-12-26 PROCEDURE — 3078F DIAST BP <80 MM HG: CPT | Performed by: INTERNAL MEDICINE

## 2024-12-26 PROCEDURE — 1160F RVW MEDS BY RX/DR IN RCRD: CPT | Performed by: INTERNAL MEDICINE

## 2024-12-26 PROCEDURE — G2211 COMPLEX E/M VISIT ADD ON: HCPCS | Performed by: INTERNAL MEDICINE

## 2024-12-26 PROCEDURE — 1157F ADVNC CARE PLAN IN RCRD: CPT | Performed by: INTERNAL MEDICINE

## 2024-12-26 PROCEDURE — 1126F AMNT PAIN NOTED NONE PRSNT: CPT | Performed by: INTERNAL MEDICINE

## 2024-12-26 PROCEDURE — 3077F SYST BP >= 140 MM HG: CPT | Performed by: INTERNAL MEDICINE

## 2024-12-26 PROCEDURE — 99214 OFFICE O/P EST MOD 30 MIN: CPT | Performed by: INTERNAL MEDICINE

## 2024-12-26 RX ORDER — GLYBURIDE 2.5 MG/1
2.5 TABLET ORAL
Qty: 90 TABLET | Refills: 3 | Status: SHIPPED | OUTPATIENT
Start: 2024-12-26

## 2024-12-26 RX ORDER — ATORVASTATIN CALCIUM 10 MG/1
10 TABLET, FILM COATED ORAL DAILY
Qty: 90 TABLET | Refills: 3 | Status: SHIPPED | OUTPATIENT
Start: 2024-12-26

## 2024-12-26 ASSESSMENT — ENCOUNTER SYMPTOMS
DIZZINESS: 0
VOMITING: 0
ABDOMINAL PAIN: 0
SORE THROAT: 0
CONSTIPATION: 0
FATIGUE: 0
COUGH: 0
DYSURIA: 0
DIARRHEA: 0
FEVER: 0
ARTHRALGIAS: 0
LIGHT-HEADEDNESS: 0
FREQUENCY: 0
SHORTNESS OF BREATH: 0
NAUSEA: 0
TROUBLE SWALLOWING: 0
PALPITATIONS: 0

## 2024-12-26 ASSESSMENT — PAIN SCALES - GENERAL: PAINLEVEL_OUTOF10: 0-NO PAIN

## 2024-12-26 NOTE — ASSESSMENT & PLAN NOTE
Both ears have significant cerumen in them.  Using warm water irrigation and a curette I attempted to remove the wax.  I was able to remove approximately 3 to 4 mm of wax out of each ear however the deeper I got the hard of the wax got.  The wax is quite deep dark brown-yellow and dull in color.  I could not visualize the tympanic membranes although the canal walls that I could see were normal.  To site despite's at least 6 attempts on each side I was unable to remove all of the cerumen and visualize the tympanic membranes    Patient will be referred to ENT for cerumen removal and was told to keep using the Debrox and do not use her hearing aids until it is properly removed.

## 2024-12-26 NOTE — PROGRESS NOTES
Subjective   Patient ID: Hyacinth Crisostomo is a 84 y.o. female who presents for 1 month follow up for HTN management and ear removal.    Patient is here for bilateral ear irrigation for cerumen impactions and hypertension control.    Patient prepped using Debrox eardrops every night in each ear for the last 5 to 7 days  Patient is very hard of hearing and cannot hear anything at all right now.  Patient does use hearing aids but cannot use them currently because of the wax buildup        Review of Systems   Constitutional:  Negative for fatigue and fever.   HENT:  Positive for hearing loss. Negative for sore throat and trouble swallowing.    Eyes:  Negative for visual disturbance.   Respiratory:  Negative for cough and shortness of breath.    Cardiovascular:  Negative for chest pain, palpitations and leg swelling.   Gastrointestinal:  Negative for abdominal pain, constipation, diarrhea, nausea and vomiting.   Genitourinary:  Negative for dysuria and frequency.   Musculoskeletal:  Negative for arthralgias.   Skin:  Negative for rash.   Neurological:  Negative for dizziness and light-headedness.       Objective   Medication Documentation Review Audit       Reviewed by Lucita Vance MD (Physician) on 12/26/24 at 0911      Medication Order Taking? Sig Documenting Provider Last Dose Status   alendronate (Fosamax) 70 mg tablet [id number] No Take 1 tablet (70 mg) by mouth 1 (one) time per week. Jeannine Landa,  Taking Active   amLODIPine (Norvasc) 10 mg tablet [id number] No Take 1 tablet (10 mg) by mouth once daily. Lucita Vance MD Taking Active   atorvastatin (Lipitor) 10 mg tablet [id number] No Take 1 tablet (10 mg) by mouth once daily. as directed Lucita Vance MD Taking Active   calcium carbonate-vit D3-min 600 mg calcium- 400 unit tablet [id number] No Take 1 tablet by mouth once daily. Historical Provider, MD Taking Active   cholecalciferol (Vitamin D-3) 10 MCG (400 UNIT) tablet [id number] No Take 1 tablet (10 mcg) by  "mouth once daily. Historical Provider, MD Taking Active   Combigan 0.2-0.5 % ophthalmic solution [id number] No Administer into affected eye(s). Historical Provider, MD Taking Active   doxazosin (Cardura) 8 mg tablet [id number]  Take 2 tablets (16 mg) by mouth once daily at bedtime. Lucita Vance MD  Active   glyBURIDE (Diabeta) 2.5 mg tablet [id number]  Take 1 tablet (2.5 mg) by mouth 2 times daily (morning and late afternoon). Lucita Vance MD  Active   hydrALAZINE (Apresoline) 25 mg tablet [id number]  Take 1 tablet (25 mg) by mouth 2 times a day. Lucita Vance MD  Active   lancets misc [id number]  Dispense One touch Delico lancets #100 with 3 refills to be used once a day and PRN if symptomatic Lucita Vance MD  Active   Lantus Solostar U-100 Insulin 100 unit/mL (3 mL) pen [id number] No Inject 15 Units under the skin once daily at bedtime. Take as directed per insulin instructions. DIANA Peterson DNP Taking Active   metoprolol tartrate (Lopressor) 100 mg tablet [id number]  Take 1 tablet (100 mg) by mouth 2 times a day. Lucita Vance MD  Active   OneTouch Ultra Test strip [id number] No USE 1 STRIP DAILY AND AS NEEDED IF SYMPTOMATIC Lucita Vance MD Taking Active   pen needle, diabetic (BD Ultra-Fine Short Pen Needle) 31 gauge x 5/16\" needle [id number] No USE AS DIRECTED. DIANA Peterson DNP Taking Active   prednisoLONE acetate (Pred-Forte) 1 % ophthalmic suspension [id number] No INSTILL 1 DROP INTO LEFT EYE ONCE A DAY Historical Provider, MD Taking Active   SITagliptin phosphate (Januvia) 100 mg tablet [id number] No Take 1 tablet (100 mg) by mouth once daily. DIANA Peterson DNP Taking Active   travoprost (Travatan Z) 0.004 % drops ophthalmic solution [id number] No Administer 1 drop into both eyes once daily at bedtime. Historical Provider, MD Taking Active                  Allergies   Allergen Reactions    Cpm-Pseudoephed-Acetaminophen Hives    Lisinopril Other     Hyperkalemia         BP " "153/78   Pulse 65   Ht 1.6 m (5' 3\")   Wt 82 kg (180 lb 12.8 oz)   SpO2 93%   BMI 32.03 kg/m²     Physical Exam  Constitutional:       Appearance: Normal appearance.   HENT:      Head: Normocephalic and atraumatic.      Right Ear: There is impacted cerumen.      Left Ear: There is impacted cerumen.      Nose: Nose normal.   Eyes:      Extraocular Movements: Extraocular movements intact.      Pupils: Pupils are equal, round, and reactive to light.   Cardiovascular:      Rate and Rhythm: Normal rate and regular rhythm.   Pulmonary:      Breath sounds: Normal breath sounds.   Abdominal:      General: Abdomen is flat. Bowel sounds are normal.      Palpations: Abdomen is soft.   Musculoskeletal:      Right lower leg: No edema.      Left lower leg: No edema.   Neurological:      Mental Status: She is alert.           Assessment/Plan   Problem List Items Addressed This Visit       Hypercholesterolemia    Relevant Medications    atorvastatin (Lipitor) 10 mg tablet    Hypertension     Does not want to add another medication will stay on her current meds.  Systolic blood pressure still elevated but slightly improved so for now we will monitor her    Patient will be seen back in March with her wellness visit at which time we will determine if we need to adjust her blood pressure medications again         Bilateral impacted cerumen - Primary     Both ears have significant cerumen in them.  Using warm water irrigation and a curette I attempted to remove the wax.  I was able to remove approximately 3 to 4 mm of wax out of each ear however the deeper I got the hard of the wax got.  The wax is quite deep dark brown-yellow and dull in color.  I could not visualize the tympanic membranes although the canal walls that I could see were normal.  To site despite's at least 6 attempts on each side I was unable to remove all of the cerumen and visualize the tympanic membranes    Patient will be referred to ENT for cerumen removal and " was told to keep using the Debrox and do not use her hearing aids until it is properly removed.         Relevant Orders    Referral to ENT     Other Visit Diagnoses       Type 2 diabetes mellitus without complication, without long-term current use of insulin (Multi)        Relevant Medications    glyBURIDE (Diabeta) 2.5 mg tablet    atorvastatin (Lipitor) 10 mg tablet                   It has been a pleasure seeing you.  Lucita Vance MD

## 2024-12-26 NOTE — PATIENT INSTRUCTIONS
Set up ent ref for ear wax removal  MARITZA Corrales here in [facility] or Dr Angel or  at [facility]    Follow up Dr Vance in March with 45 min wellness exam

## 2024-12-26 NOTE — ASSESSMENT & PLAN NOTE
Does not want to add another medication will stay on her current meds.  Systolic blood pressure still elevated but slightly improved so for now we will monitor her    Patient will be seen back in March with her wellness visit at which time we will determine if we need to adjust her blood pressure medications again

## 2025-02-25 DIAGNOSIS — N18.4 CHRONIC KIDNEY DISEASE WITH ACTIVE MEDICAL MANAGEMENT WITHOUT DIALYSIS, STAGE 4 (SEVERE) (MULTI): ICD-10-CM

## 2025-02-26 RX ORDER — INSULIN GLARGINE 100 [IU]/ML
15 INJECTION, SOLUTION SUBCUTANEOUS NIGHTLY
Qty: 9 ML | Refills: 3 | Status: SHIPPED | OUTPATIENT
Start: 2025-02-26

## 2025-03-10 DIAGNOSIS — I10 PRIMARY HYPERTENSION: ICD-10-CM

## 2025-03-10 RX ORDER — DOXAZOSIN 8 MG/1
16 TABLET ORAL NIGHTLY
Qty: 60 TABLET | Refills: 3 | Status: SHIPPED | OUTPATIENT
Start: 2025-03-10 | End: 2025-06-08

## 2025-03-10 NOTE — TELEPHONE ENCOUNTER
Patient came in to the office and states that she needs more Doxazosin Besylate 8mg.  [facility]      Hyacinth Crisostomo  [phone]

## 2025-03-11 LAB
25(OH)D3+25(OH)D2 SERPL-MCNC: 45 NG/ML (ref 30–100)
ALBUMIN SERPL-MCNC: 4.3 G/DL (ref 3.6–5.1)
ALBUMIN/CREAT UR: 93 MG/G CREAT
ALP SERPL-CCNC: 93 U/L (ref 37–153)
ALT SERPL-CCNC: 17 U/L (ref 6–29)
ANION GAP SERPL CALCULATED.4IONS-SCNC: 13 MMOL/L (CALC) (ref 7–17)
APPEARANCE UR: CLEAR
AST SERPL-CCNC: 19 U/L (ref 10–35)
BACTERIA #/AREA URNS HPF: ABNORMAL /HPF
BILIRUB SERPL-MCNC: 0.5 MG/DL (ref 0.2–1.2)
BILIRUB UR QL STRIP: NEGATIVE
BUN SERPL-MCNC: 35 MG/DL (ref 7–25)
CALCIUM SERPL-MCNC: 9.8 MG/DL (ref 8.6–10.4)
CHLORIDE SERPL-SCNC: 105 MMOL/L (ref 98–110)
CO2 SERPL-SCNC: 23 MMOL/L (ref 20–32)
COLOR UR: YELLOW
CREAT SERPL-MCNC: 1.74 MG/DL (ref 0.6–0.95)
CREAT UR-MCNC: 155 MG/DL (ref 20–275)
EGFRCR SERPLBLD CKD-EPI 2021: 29 ML/MIN/1.73M2
ERYTHROCYTE [DISTWIDTH] IN BLOOD BY AUTOMATED COUNT: 12.3 % (ref 11–15)
GLUCOSE SERPL-MCNC: 145 MG/DL (ref 65–99)
GLUCOSE UR QL STRIP: NEGATIVE
HCT VFR BLD AUTO: 41.3 % (ref 35–45)
HGB BLD-MCNC: 13.4 G/DL (ref 11.7–15.5)
HGB UR QL STRIP: NEGATIVE
HYALINE CASTS #/AREA URNS LPF: ABNORMAL /LPF
KETONES UR QL STRIP: ABNORMAL
LEUKOCYTE ESTERASE UR QL STRIP: ABNORMAL
MAGNESIUM SERPL-MCNC: 2.2 MG/DL (ref 1.5–2.5)
MCH RBC QN AUTO: 29.5 PG (ref 27–33)
MCHC RBC AUTO-ENTMCNC: 32.4 G/DL (ref 32–36)
MCV RBC AUTO: 90.8 FL (ref 80–100)
MICROALBUMIN UR-MCNC: 14.4 MG/DL
NITRITE UR QL STRIP: NEGATIVE
PH UR STRIP: ABNORMAL [PH] (ref 5–8)
PHOSPHATE SERPL-MCNC: 4.4 MG/DL (ref 2.1–4.3)
PLATELET # BLD AUTO: 285 THOUSAND/UL (ref 140–400)
PMV BLD REES-ECKER: 11.8 FL (ref 7.5–12.5)
POTASSIUM SERPL-SCNC: 5.1 MMOL/L (ref 3.5–5.3)
PROT SERPL-MCNC: 7.7 G/DL (ref 6.1–8.1)
PROT UR QL STRIP: ABNORMAL
RBC # BLD AUTO: 4.55 MILLION/UL (ref 3.8–5.1)
RBC #/AREA URNS HPF: ABNORMAL /HPF
SERVICE CMNT-IMP: ABNORMAL
SODIUM SERPL-SCNC: 141 MMOL/L (ref 135–146)
SP GR UR STRIP: 1.02 (ref 1–1.03)
SQUAMOUS #/AREA URNS HPF: ABNORMAL /HPF
WBC # BLD AUTO: 11.2 THOUSAND/UL (ref 3.8–10.8)
WBC #/AREA URNS HPF: ABNORMAL /HPF

## 2025-03-13 ENCOUNTER — APPOINTMENT (OUTPATIENT)
Dept: NEPHROLOGY | Facility: CLINIC | Age: 85
End: 2025-03-13
Payer: MEDICARE

## 2025-03-13 VITALS
HEART RATE: 65 BPM | SYSTOLIC BLOOD PRESSURE: 140 MMHG | WEIGHT: 178.6 LBS | DIASTOLIC BLOOD PRESSURE: 62 MMHG | BODY MASS INDEX: 31.64 KG/M2 | HEIGHT: 63 IN

## 2025-03-13 DIAGNOSIS — E11.9 TYPE 2 DIABETES MELLITUS WITHOUT COMPLICATIONS (MULTI): ICD-10-CM

## 2025-03-13 DIAGNOSIS — E78.00 HYPERCHOLESTEROLEMIA: ICD-10-CM

## 2025-03-13 DIAGNOSIS — N18.4 CHRONIC KIDNEY DISEASE WITH ACTIVE MEDICAL MANAGEMENT WITHOUT DIALYSIS, STAGE 4 (SEVERE) (MULTI): Primary | ICD-10-CM

## 2025-03-13 DIAGNOSIS — I10 PRIMARY HYPERTENSION: ICD-10-CM

## 2025-03-13 PROCEDURE — 1160F RVW MEDS BY RX/DR IN RCRD: CPT | Performed by: CLINICAL NURSE SPECIALIST

## 2025-03-13 PROCEDURE — 3077F SYST BP >= 140 MM HG: CPT | Performed by: CLINICAL NURSE SPECIALIST

## 2025-03-13 PROCEDURE — 1036F TOBACCO NON-USER: CPT | Performed by: CLINICAL NURSE SPECIALIST

## 2025-03-13 PROCEDURE — 1157F ADVNC CARE PLAN IN RCRD: CPT | Performed by: CLINICAL NURSE SPECIALIST

## 2025-03-13 PROCEDURE — 99213 OFFICE O/P EST LOW 20 MIN: CPT | Performed by: CLINICAL NURSE SPECIALIST

## 2025-03-13 PROCEDURE — 3078F DIAST BP <80 MM HG: CPT | Performed by: CLINICAL NURSE SPECIALIST

## 2025-03-13 PROCEDURE — 1159F MED LIST DOCD IN RCRD: CPT | Performed by: CLINICAL NURSE SPECIALIST

## 2025-03-13 RX ORDER — PEN NEEDLE, DIABETIC 30 GX3/16"
100 NEEDLE, DISPOSABLE MISCELLANEOUS NIGHTLY
Qty: 2 EACH | Refills: 3 | Status: SHIPPED | OUTPATIENT
Start: 2025-03-13 | End: 2025-03-13

## 2025-03-13 RX ORDER — PEN NEEDLE, DIABETIC 31 GX5/16"
NEEDLE, DISPOSABLE MISCELLANEOUS
Qty: 100 EACH | Refills: 3 | Status: SHIPPED | OUTPATIENT
Start: 2025-03-13

## 2025-03-13 ASSESSMENT — ENCOUNTER SYMPTOMS
CONSTITUTIONAL NEGATIVE: 1
MUSCULOSKELETAL NEGATIVE: 1
CARDIOVASCULAR NEGATIVE: 1
NEUROLOGICAL NEGATIVE: 1
ENDOCRINE NEGATIVE: 1
GASTROINTESTINAL NEGATIVE: 1
PSYCHIATRIC NEGATIVE: 1
RESPIRATORY NEGATIVE: 1

## 2025-03-13 NOTE — PROGRESS NOTES
Subjective   Patient ID: Hyacinth Crisostomo is a 85 y.o. female who presents for Follow-up (3 months/Review labs ).  Being seen in follow-up for chronic kidney disease stage IV with history of hypertension and diabetes mellitus    Labs reviewed  Urinalysis with trace ketones, 1+ protein, 2+ leukocytes, 10-20 WBCs with moderate bacteria  Albumin creatinine ratio 93  Vitamin D 45  H&H 13.4 and 41.3  Glucose 145  Renal function with a BUN of 35 and creatinine of 1.74, GFR is 29  Sodium 141, potassium 5.1, chloride 105, bicarb 23  Phosphorus 4.4  Magnesium 2.2    She has been feeling well  She denies any further lightheadedness or dizziness  She does check her blood pressure at home and states that occasionally it is in the 150s systolically  She denies any lower extremity swelling, she did have problems with this in the past but states they are back down to their normal  She is voiding without difficulties            Review of Systems   Constitutional: Negative.    Respiratory: Negative.     Cardiovascular: Negative.    Gastrointestinal: Negative.    Endocrine: Negative.    Genitourinary: Negative.    Musculoskeletal: Negative.    Skin: Negative.    Neurological: Negative.    Psychiatric/Behavioral: Negative.         Objective   Physical Exam  Vitals reviewed.   Constitutional:       Appearance: Normal appearance.   HENT:      Head: Normocephalic.   Cardiovascular:      Rate and Rhythm: Normal rate and regular rhythm.   Pulmonary:      Effort: Pulmonary effort is normal.      Breath sounds: Normal breath sounds.   Abdominal:      Palpations: Abdomen is soft.   Musculoskeletal:         General: Normal range of motion.   Skin:     General: Skin is warm and dry.   Neurological:      Mental Status: She is alert and oriented to person, place, and time.   Psychiatric:         Mood and Affect: Mood normal.         Behavior: Behavior normal.       Assessment/Plan   Problem List Items Addressed This Visit             ICD-10-CM     "Chronic kidney disease with active medical management without dialysis, stage 4 (severe) (Multi) - Primary N18.4     Renal function is stable with creatinine of 1.74, blood pressure is well-controlled in the office, is high sometimes at home, I did speak with her about this however she does not wish to start any further medications at this time, she also does not wish to start SGLT2,         Relevant Medications    SITagliptin phosphate (Januvia) 100 mg tablet    Other Relevant Orders    Comprehensive metabolic panel    Follow Up In Nephrology    Hypercholesterolemia E78.00    Hypertension I10     Blood pressure is good in the office today on amlodipine and Cardura, has not been taking hydralazine, will continue with current medications          Other Visit Diagnoses         Codes    Type 2 diabetes mellitus without complications (Multi)     E11.9    Relevant Medications    pen needle, diabetic (BD Ultra-Fine Short Pen Needle) 31 gauge x 5/16\" needle             CKD IV with baseline creatinine about 1.6-1.9  Diabetes mellitus type 2 for 40 years  Hypertension  Glaucoma  Left vision loss  Dyslipidemia  Slight hyperkalemia  Vitamin D deficiency   Peripheral edema: Slight     Andreina Clemens, SEN-KEMAR, DNP 03/13/25 1:28 PM   "

## 2025-03-13 NOTE — ASSESSMENT & PLAN NOTE
Blood pressure is good in the office today on amlodipine and Cardura, has not been taking hydralazine, will continue with current medications

## 2025-03-13 NOTE — ASSESSMENT & PLAN NOTE
Renal function is stable with creatinine of 1.74, blood pressure is well-controlled in the office, is high sometimes at home, I did speak with her about this however she does not wish to start any further medications at this time, she also does not wish to start SGLT2,

## 2025-03-31 ENCOUNTER — APPOINTMENT (OUTPATIENT)
Dept: PRIMARY CARE | Facility: CLINIC | Age: 85
End: 2025-03-31
Payer: MEDICARE

## 2025-03-31 VITALS
SYSTOLIC BLOOD PRESSURE: 122 MMHG | OXYGEN SATURATION: 96 % | WEIGHT: 179.8 LBS | HEART RATE: 65 BPM | BODY MASS INDEX: 31.86 KG/M2 | DIASTOLIC BLOOD PRESSURE: 64 MMHG | HEIGHT: 63 IN

## 2025-03-31 DIAGNOSIS — I10 PRIMARY HYPERTENSION: ICD-10-CM

## 2025-03-31 DIAGNOSIS — Z00.00 WELLNESS EXAMINATION: ICD-10-CM

## 2025-03-31 DIAGNOSIS — Z71.89 ACP (ADVANCE CARE PLANNING): ICD-10-CM

## 2025-03-31 DIAGNOSIS — E87.5 HYPERKALEMIA: ICD-10-CM

## 2025-03-31 DIAGNOSIS — N18.4 CONTROLLED TYPE 2 DIABETES MELLITUS WITH STAGE 4 CHRONIC KIDNEY DISEASE, WITHOUT LONG-TERM CURRENT USE OF INSULIN (MULTI): ICD-10-CM

## 2025-03-31 DIAGNOSIS — E11.9 TYPE 2 DIABETES MELLITUS WITHOUT COMPLICATION, WITHOUT LONG-TERM CURRENT USE OF INSULIN: Primary | ICD-10-CM

## 2025-03-31 DIAGNOSIS — H90.3 BILATERAL SENSORINEURAL HEARING LOSS: ICD-10-CM

## 2025-03-31 DIAGNOSIS — E78.00 HYPERCHOLESTEROLEMIA: ICD-10-CM

## 2025-03-31 DIAGNOSIS — M81.0 AGE-RELATED OSTEOPOROSIS WITHOUT CURRENT PATHOLOGICAL FRACTURE: ICD-10-CM

## 2025-03-31 DIAGNOSIS — E11.22 CONTROLLED TYPE 2 DIABETES MELLITUS WITH STAGE 4 CHRONIC KIDNEY DISEASE, WITHOUT LONG-TERM CURRENT USE OF INSULIN (MULTI): ICD-10-CM

## 2025-03-31 DIAGNOSIS — N18.4 CHRONIC KIDNEY DISEASE WITH ACTIVE MEDICAL MANAGEMENT WITHOUT DIALYSIS, STAGE 4 (SEVERE) (MULTI): ICD-10-CM

## 2025-03-31 DIAGNOSIS — Z66 DNR (DO NOT RESUSCITATE): ICD-10-CM

## 2025-03-31 PROBLEM — Z13.39 ALCOHOL SCREENING: Status: RESOLVED | Noted: 2024-02-29 | Resolved: 2025-03-31

## 2025-03-31 PROBLEM — Z13.89 SCREENING FOR MULTIPLE CONDITIONS: Status: RESOLVED | Noted: 2024-02-29 | Resolved: 2025-03-31

## 2025-03-31 PROCEDURE — 1036F TOBACCO NON-USER: CPT | Performed by: INTERNAL MEDICINE

## 2025-03-31 PROCEDURE — 1123F ACP DISCUSS/DSCN MKR DOCD: CPT | Performed by: INTERNAL MEDICINE

## 2025-03-31 PROCEDURE — 1170F FXNL STATUS ASSESSED: CPT | Performed by: INTERNAL MEDICINE

## 2025-03-31 PROCEDURE — 1158F ADVNC CARE PLAN TLK DOCD: CPT | Performed by: INTERNAL MEDICINE

## 2025-03-31 PROCEDURE — 99214 OFFICE O/P EST MOD 30 MIN: CPT | Performed by: INTERNAL MEDICINE

## 2025-03-31 PROCEDURE — 3078F DIAST BP <80 MM HG: CPT | Performed by: INTERNAL MEDICINE

## 2025-03-31 PROCEDURE — 99497 ADVNCD CARE PLAN 30 MIN: CPT | Performed by: INTERNAL MEDICINE

## 2025-03-31 PROCEDURE — 1126F AMNT PAIN NOTED NONE PRSNT: CPT | Performed by: INTERNAL MEDICINE

## 2025-03-31 PROCEDURE — 1157F ADVNC CARE PLAN IN RCRD: CPT | Performed by: INTERNAL MEDICINE

## 2025-03-31 PROCEDURE — G0439 PPPS, SUBSEQ VISIT: HCPCS | Performed by: INTERNAL MEDICINE

## 2025-03-31 PROCEDURE — 3074F SYST BP LT 130 MM HG: CPT | Performed by: INTERNAL MEDICINE

## 2025-03-31 PROCEDURE — 1159F MED LIST DOCD IN RCRD: CPT | Performed by: INTERNAL MEDICINE

## 2025-03-31 PROCEDURE — 1160F RVW MEDS BY RX/DR IN RCRD: CPT | Performed by: INTERNAL MEDICINE

## 2025-03-31 RX ORDER — AMLODIPINE BESYLATE 10 MG/1
10 TABLET ORAL DAILY
Qty: 90 TABLET | Refills: 3 | Status: SHIPPED | OUTPATIENT
Start: 2025-03-31

## 2025-03-31 SDOH — ECONOMIC STABILITY: INCOME INSECURITY: IN THE LAST 12 MONTHS, WAS THERE A TIME WHEN YOU WERE NOT ABLE TO PAY THE MORTGAGE OR RENT ON TIME?: NO

## 2025-03-31 SDOH — HEALTH STABILITY: PHYSICAL HEALTH: ON AVERAGE, HOW MANY MINUTES DO YOU ENGAGE IN EXERCISE AT THIS LEVEL?: 0 MIN

## 2025-03-31 SDOH — ECONOMIC STABILITY: FOOD INSECURITY: WITHIN THE PAST 12 MONTHS, YOU WORRIED THAT YOUR FOOD WOULD RUN OUT BEFORE YOU GOT MONEY TO BUY MORE.: NEVER TRUE

## 2025-03-31 SDOH — ECONOMIC STABILITY: FOOD INSECURITY: WITHIN THE PAST 12 MONTHS, THE FOOD YOU BOUGHT JUST DIDN'T LAST AND YOU DIDN'T HAVE MONEY TO GET MORE.: NEVER TRUE

## 2025-03-31 SDOH — ECONOMIC STABILITY: GENERAL
WHICH OF THE FOLLOWING WOULD YOU LIKE TO GET CONNECTED TO IN ORDER TO RECEIVE A DISCOUNT OR FOR FREE? (CHOOSE ALL THAT APPLY): NO ASSISTANCE NEEDED

## 2025-03-31 SDOH — HEALTH STABILITY: PHYSICAL HEALTH: ON AVERAGE, HOW MANY DAYS PER WEEK DO YOU ENGAGE IN MODERATE TO STRENUOUS EXERCISE (LIKE A BRISK WALK)?: 0 DAYS

## 2025-03-31 ASSESSMENT — ACTIVITIES OF DAILY LIVING (ADL)
USING TRANSPORTATION: INDEPENDENT
HEARING - LEFT EAR: HEARING AID
MANAGING FINANCES: INDEPENDENT
PREPARING MEALS: INDEPENDENT
WALKS IN HOME: INDEPENDENT
EATING: INDEPENDENT
PILL BOX USED: YES
TAKING MEDICATION: INDEPENDENT
DOING HOUSEWORK: INDEPENDENT
HEARING - RIGHT EAR: HEARING AID
NEEDS ASSISTANCE WITH FOOD: INDEPENDENT
ADEQUATE_TO_COMPLETE_ADL: YES
TOILETING: INDEPENDENT
GROOMING: INDEPENDENT
PATIENT'S MEMORY ADEQUATE TO SAFELY COMPLETE DAILY ACTIVITIES?: YES
DRESSING YOURSELF: INDEPENDENT
BATHING: INDEPENDENT
FEEDING YOURSELF: INDEPENDENT
USING TELEPHONE: INDEPENDENT
STIL DRIVING: YES
GROCERY SHOPPING: INDEPENDENT
JUDGMENT_ADEQUATE_SAFELY_COMPLETE_DAILY_ACTIVITIES: YES

## 2025-03-31 ASSESSMENT — SOCIAL DETERMINANTS OF HEALTH (SDOH)
HOW OFTEN DO YOU GET TOGETHER WITH FRIENDS OR RELATIVES?: TWICE A WEEK
IN THE PAST 12 MONTHS, HAS THE ELECTRIC, GAS, OIL, OR WATER COMPANY THREATENED TO SHUT OFF SERVICE IN YOUR HOME?: NO
WITHIN THE LAST YEAR, HAVE TO BEEN RAPED OR FORCED TO HAVE ANY KIND OF SEXUAL ACTIVITY BY YOUR PARTNER OR EX-PARTNER?: NO
WITHIN THE LAST YEAR, HAVE YOU BEEN HUMILIATED OR EMOTIONALLY ABUSED IN OTHER WAYS BY YOUR PARTNER OR EX-PARTNER?: NO
HOW HARD IS IT FOR YOU TO PAY FOR THE VERY BASICS LIKE FOOD, HOUSING, MEDICAL CARE, AND HEATING?: NOT HARD AT ALL
WITHIN THE LAST YEAR, HAVE YOU BEEN KICKED, HIT, SLAPPED, OR OTHERWISE PHYSICALLY HURT BY YOUR PARTNER OR EX-PARTNER?: NO
WITHIN THE LAST YEAR, HAVE YOU BEEN AFRAID OF YOUR PARTNER OR EX-PARTNER?: NO
HOW OFTEN DO YOU ATTENT MEETINGS OF THE CLUB OR ORGANIZATION YOU BELONG TO?: MORE THAN 4 TIMES PER YEAR
DO YOU BELONG TO ANY CLUBS OR ORGANIZATIONS SUCH AS CHURCH GROUPS UNIONS, FRATERNAL OR ATHLETIC GROUPS, OR SCHOOL GROUPS?: YES
IN A TYPICAL WEEK, HOW MANY TIMES DO YOU TALK ON THE PHONE WITH FAMILY, FRIENDS, OR NEIGHBORS?: THREE TIMES A WEEK
HOW OFTEN DO YOU ATTEND CHURCH OR RELIGIOUS SERVICES?: MORE THAN 4 TIMES PER YEAR

## 2025-03-31 ASSESSMENT — ENCOUNTER SYMPTOMS
FEVER: 0
VOMITING: 0
FREQUENCY: 0
DIARRHEA: 0
CONSTIPATION: 0
FATIGUE: 0
SHORTNESS OF BREATH: 0
TROUBLE SWALLOWING: 0
NAUSEA: 0
PALPITATIONS: 0
SORE THROAT: 0
ARTHRALGIAS: 0
LIGHT-HEADEDNESS: 0
DIZZINESS: 0
COUGH: 0
DYSURIA: 0
ABDOMINAL PAIN: 0

## 2025-03-31 ASSESSMENT — LIFESTYLE VARIABLES
SKIP TO QUESTIONS 9-10: 1
HOW OFTEN DO YOU HAVE A DRINK CONTAINING ALCOHOL: NEVER
HOW MANY STANDARD DRINKS CONTAINING ALCOHOL DO YOU HAVE ON A TYPICAL DAY: PATIENT DOES NOT DRINK
HOW OFTEN DO YOU HAVE SIX OR MORE DRINKS ON ONE OCCASION: NEVER
AUDIT-C TOTAL SCORE: 0

## 2025-03-31 ASSESSMENT — PAIN SCALES - GENERAL: PAINLEVEL_OUTOF10: 0-NO PAIN

## 2025-03-31 NOTE — ASSESSMENT & PLAN NOTE
Annual wellness visit completed today.  Patient remains independent in all of her ADLs and IADLs.  Safety issues have all been met in the home.  Patient is still very hard of hearing even with hearing aids.  Advance care planning was reviewed in detail today.

## 2025-03-31 NOTE — ASSESSMENT & PLAN NOTE
Patient's cholesterol is stable on atorvastatin 10 mg daily.  Annual labs due are due at this time.    Orders:    Albumin-Creatinine Ratio, Urine Random; Future    CBC; Future    Comprehensive Metabolic Panel; Future    Hemoglobin A1C; Future    Lipid Panel; Future    Vitamin D 25-Hydroxy,Total (for eval of Vitamin D levels); Future    TSH with reflex to Free T4 if abnormal; Future

## 2025-03-31 NOTE — ASSESSMENT & PLAN NOTE
Patient is due both for hemoglobin A1c and urine microalbumin at this time.  Orders:    Albumin-Creatinine Ratio, Urine Random; Future    CBC; Future    Comprehensive Metabolic Panel; Future    Hemoglobin A1C; Future    Lipid Panel; Future    Vitamin D 25-Hydroxy,Total (for eval of Vitamin D levels); Future    TSH with reflex to Free T4 if abnormal; Future

## 2025-03-31 NOTE — ASSESSMENT & PLAN NOTE
Patient has chronic kidney disease presumably from her age, hypertension and diabetes.  She continues to drink plenty of fluids and was reminded to avoid NSAIDs

## 2025-03-31 NOTE — PATIENT INSTRUCTIONS
Please get  fasting labs     Follow up Dr Vance in 4 months for HTN/ DM etc  30 min appointment

## 2025-03-31 NOTE — ASSESSMENT & PLAN NOTE
Approximately 17 min was spent on ACP.  Patient has a DNR Comfort Care arrest order which remains intact.  She does not want any aggressive measures as she notes she has had a long fruitful life and does not want to be kept alive by machines.  We do have a living will on record but not a power of  for healthcare.  She notes that she would want her  Artem to make medical decisions for her.  If he is unable to then she would expect her daughter Marly Pichardo to make determinations of medical decisions.  When running 3 different scenarios patient states she would not mind being on a ventilator short-term if she had a mild pneumonia but would not want anything long-term.  She definitely does not want a trach or feeding tube or any other invasive procedures at this time.

## 2025-03-31 NOTE — ASSESSMENT & PLAN NOTE
Orders:    Albumin-Creatinine Ratio, Urine Random; Future    CBC; Future    Comprehensive Metabolic Panel; Future    Hemoglobin A1C; Future    Lipid Panel; Future    Vitamin D 25-Hydroxy,Total (for eval of Vitamin D levels); Future    TSH with reflex to Free T4 if abnormal; Future

## 2025-03-31 NOTE — PROGRESS NOTES
Subjective   Reason for Visit: Hyacinth Crisostomo is an 85 y.o. female here for a Medicare Wellness visit.     Past Medical, Surgical, and Family History reviewed and updated in chart.    Reviewed all medications by prescribing practitioner or clinical pharmacist (such as prescriptions, OTCs, herbal therapies and supplements) and documented in the medical record.    Patient is here for annual wellness visit as well as management of her medical problems including cholesterol hypertension diabetes osteoporosis and chronic renal insufficiency.  Patient was supposed to be taking hydralazine but she was feeling more more fatigued last fall and stopped taking it.  She forgot to tell me that she stopped taking it but has not been on it for several months.  Repeat blood pressure reading today was 122/64 therefore she will stay off the hydralazine as I do not believe she needs it    Patient does have a DNR Comfort Care arrest order which we reviewed and she would like to keep in place  We also went into some depth about advance care planning.  Patient states she is willing to be on a ventilator for short-term but does not want anything long-term.  She also does not want a trach or feeding tube even if she needs one to survive.  She has a living well which has been signed.  She does not have a power of  but states she would want her  Artem to make medical decisions first followed by her daughter Marly Pichardo.  I did tell the patient to make this legal she would need a power of  for healthcare            Patient Care Team:  Lucita Vance MD as PCP - General  Lucita Vance MD as PCP - [employer] PCP  Bi Rondon MD as Referring Physician (Otolaryngology)  Jose Adam MD as Referring Physician (Gastroenterology)  Erna Garcia OD as Referring Physician (Optometry)  SEN Peterson-CNP, DNP as Nurse Practitioner (Nephrology)  Hakeem Clemens, DO as Consulting  "Physician (Nephrology)     Review of Systems   Constitutional:  Negative for fatigue and fever.   HENT:  Negative for sore throat and trouble swallowing.    Eyes:  Negative for visual disturbance.   Respiratory:  Negative for cough and shortness of breath.    Cardiovascular:  Negative for chest pain, palpitations and leg swelling.   Gastrointestinal:  Negative for abdominal pain, constipation, diarrhea, nausea and vomiting.   Genitourinary:  Negative for dysuria and frequency.   Musculoskeletal:  Negative for arthralgias.   Skin:  Negative for rash.   Neurological:  Negative for dizziness and light-headedness.       Objective   Vitals:  /64   Pulse 65   Ht 1.6 m (5' 3\")   Wt 81.6 kg (179 lb 12.8 oz)   SpO2 96%   BMI 31.85 kg/m²       Physical Exam  Constitutional:       Appearance: Normal appearance.   HENT:      Head: Normocephalic and atraumatic.      Nose: Nose normal.   Eyes:      Extraocular Movements: Extraocular movements intact.      Pupils: Pupils are equal, round, and reactive to light.   Cardiovascular:      Rate and Rhythm: Normal rate and regular rhythm.   Pulmonary:      Breath sounds: Normal breath sounds.   Abdominal:      General: Abdomen is flat. Bowel sounds are normal.      Palpations: Abdomen is soft.   Musculoskeletal:      Right lower leg: No edema.      Left lower leg: No edema.   Neurological:      Mental Status: She is alert.         Assessment & Plan  Controlled type 2 diabetes mellitus with stage 4 chronic kidney disease, without long-term current use of insulin (Multi)  Patient is due both for hemoglobin A1c and urine microalbumin at this time.  Orders:    Albumin-Creatinine Ratio, Urine Random; Future    CBC; Future    Comprehensive Metabolic Panel; Future    Hemoglobin A1C; Future    Lipid Panel; Future    Vitamin D 25-Hydroxy,Total (for eval of Vitamin D levels); Future    TSH with reflex to Free T4 if abnormal; Future    Primary hypertension    Orders:    amLODIPine " (Norvasc) 10 mg tablet; Take 1 tablet (10 mg) by mouth once daily.    Albumin-Creatinine Ratio, Urine Random; Future    CBC; Future    Comprehensive Metabolic Panel; Future    Hemoglobin A1C; Future    Lipid Panel; Future    Vitamin D 25-Hydroxy,Total (for eval of Vitamin D levels); Future    TSH with reflex to Free T4 if abnormal; Future    Type 2 diabetes mellitus without complication, without long-term current use of insulin    Orders:    Albumin-Creatinine Ratio, Urine Random; Future    CBC; Future    Comprehensive Metabolic Panel; Future    Hemoglobin A1C; Future    Lipid Panel; Future    Vitamin D 25-Hydroxy,Total (for eval of Vitamin D levels); Future    TSH with reflex to Free T4 if abnormal; Future    Hyperkalemia    Orders:    Albumin-Creatinine Ratio, Urine Random; Future    CBC; Future    Comprehensive Metabolic Panel; Future    Hemoglobin A1C; Future    Lipid Panel; Future    Vitamin D 25-Hydroxy,Total (for eval of Vitamin D levels); Future    TSH with reflex to Free T4 if abnormal; Future    Hypercholesterolemia  Patient's cholesterol is stable on atorvastatin 10 mg daily.  Annual labs due are due at this time.    Orders:    Albumin-Creatinine Ratio, Urine Random; Future    CBC; Future    Comprehensive Metabolic Panel; Future    Hemoglobin A1C; Future    Lipid Panel; Future    Vitamin D 25-Hydroxy,Total (for eval of Vitamin D levels); Future    TSH with reflex to Free T4 if abnormal; Future    Age-related osteoporosis without current pathological fracture    Orders:    Albumin-Creatinine Ratio, Urine Random; Future    CBC; Future    Comprehensive Metabolic Panel; Future    Hemoglobin A1C; Future    Lipid Panel; Future    Vitamin D 25-Hydroxy,Total (for eval of Vitamin D levels); Future    TSH with reflex to Free T4 if abnormal; Future    DNR (do not resuscitate)  Patient is currently DNR Comfort Care arrest and we reviewed that information.  Patient remains DNR Comfort Care arrest and understands that  if her heart stops she would not be resuscitated.  These are in keeping with her other wishes on her living well.         ACP (advance care planning)  Approximately 17 min was spent on ACP.  Patient has a DNR Comfort Care arrest order which remains intact.  She does not want any aggressive measures as she notes she has had a long fruitful life and does not want to be kept alive by machines.  We do have a living will on record but not a power of  for healthcare.  She notes that she would want her  Artem to make medical decisions for her.  If he is unable to then she would expect her daughter Marly Pichardo to make determinations of medical decisions.  When running 3 different scenarios patient states she would not mind being on a ventilator short-term if she had a mild pneumonia but would not want anything long-term.  She definitely does not want a trach or feeding tube or any other invasive procedures at this time.         Bilateral sensorineural hearing loss         Chronic kidney disease with active medical management without dialysis, stage 4 (severe) (Multi)  Patient has chronic kidney disease presumably from her age, hypertension and diabetes.  She continues to drink plenty of fluids and was reminded to avoid NSAIDs         Wellness examination  Annual wellness visit completed today.  Patient remains independent in all of her ADLs and IADLs.  Safety issues have all been met in the home.  Patient is still very hard of hearing even with hearing aids.  Advance care planning was reviewed in detail today.

## 2025-03-31 NOTE — ASSESSMENT & PLAN NOTE
Patient is currently DNR Comfort Care arrest and we reviewed that information.  Patient remains DNR Comfort Care arrest and understands that if her heart stops she would not be resuscitated.  These are in keeping with her other wishes on her living well.

## 2025-03-31 NOTE — ASSESSMENT & PLAN NOTE
Orders:    amLODIPine (Norvasc) 10 mg tablet; Take 1 tablet (10 mg) by mouth once daily.    Albumin-Creatinine Ratio, Urine Random; Future    CBC; Future    Comprehensive Metabolic Panel; Future    Hemoglobin A1C; Future    Lipid Panel; Future    Vitamin D 25-Hydroxy,Total (for eval of Vitamin D levels); Future    TSH with reflex to Free T4 if abnormal; Future

## 2025-04-01 LAB
25(OH)D3+25(OH)D2 SERPL-MCNC: 40 NG/ML (ref 30–100)
ALBUMIN SERPL-MCNC: 4.2 G/DL (ref 3.6–5.1)
ALBUMIN/CREAT UR: 157 MG/G CREAT
ALP SERPL-CCNC: 89 U/L (ref 37–153)
ALT SERPL-CCNC: 17 U/L (ref 6–29)
ANION GAP SERPL CALCULATED.4IONS-SCNC: 8 MMOL/L (CALC) (ref 7–17)
AST SERPL-CCNC: 18 U/L (ref 10–35)
BILIRUB SERPL-MCNC: 0.4 MG/DL (ref 0.2–1.2)
BUN SERPL-MCNC: 30 MG/DL (ref 7–25)
CALCIUM SERPL-MCNC: 9.8 MG/DL (ref 8.6–10.4)
CHLORIDE SERPL-SCNC: 105 MMOL/L (ref 98–110)
CHOLEST SERPL-MCNC: 111 MG/DL
CHOLEST/HDLC SERPL: 3 (CALC)
CO2 SERPL-SCNC: 27 MMOL/L (ref 20–32)
CREAT SERPL-MCNC: 1.7 MG/DL (ref 0.6–0.95)
CREAT UR-MCNC: 150 MG/DL (ref 20–275)
EGFRCR SERPLBLD CKD-EPI 2021: 29 ML/MIN/1.73M2
ERYTHROCYTE [DISTWIDTH] IN BLOOD BY AUTOMATED COUNT: 12.4 % (ref 11–15)
EST. AVERAGE GLUCOSE BLD GHB EST-MCNC: 189 MG/DL
EST. AVERAGE GLUCOSE BLD GHB EST-SCNC: 10.4 MMOL/L
GLUCOSE SERPL-MCNC: 169 MG/DL (ref 65–99)
HBA1C MFR BLD: 8.2 % OF TOTAL HGB
HCT VFR BLD AUTO: 40.4 % (ref 35–45)
HDLC SERPL-MCNC: 37 MG/DL
HGB BLD-MCNC: 13.2 G/DL (ref 11.7–15.5)
LDLC SERPL CALC-MCNC: 56 MG/DL (CALC)
MCH RBC QN AUTO: 29.3 PG (ref 27–33)
MCHC RBC AUTO-ENTMCNC: 32.7 G/DL (ref 32–36)
MCV RBC AUTO: 89.8 FL (ref 80–100)
MICROALBUMIN UR-MCNC: 23.5 MG/DL
NONHDLC SERPL-MCNC: 74 MG/DL (CALC)
PLATELET # BLD AUTO: 264 THOUSAND/UL (ref 140–400)
PMV BLD REES-ECKER: 11.8 FL (ref 7.5–12.5)
POTASSIUM SERPL-SCNC: 5 MMOL/L (ref 3.5–5.3)
PROT SERPL-MCNC: 7.6 G/DL (ref 6.1–8.1)
RBC # BLD AUTO: 4.5 MILLION/UL (ref 3.8–5.1)
SODIUM SERPL-SCNC: 140 MMOL/L (ref 135–146)
TRIGL SERPL-MCNC: 97 MG/DL
TSH SERPL-ACNC: 1.8 MIU/L (ref 0.4–4.5)
WBC # BLD AUTO: 11 THOUSAND/UL (ref 3.8–10.8)

## 2025-04-02 DIAGNOSIS — E11.9 TYPE 2 DIABETES MELLITUS WITHOUT COMPLICATION, WITHOUT LONG-TERM CURRENT USE OF INSULIN: ICD-10-CM

## 2025-04-02 RX ORDER — GLYBURIDE 5 MG/1
5 TABLET ORAL
Qty: 60 TABLET | Refills: 11 | Status: SHIPPED | OUTPATIENT
Start: 2025-04-02 | End: 2026-04-02

## 2025-06-02 DIAGNOSIS — N18.4 CONTROLLED TYPE 2 DIABETES MELLITUS WITH STAGE 4 CHRONIC KIDNEY DISEASE, WITHOUT LONG-TERM CURRENT USE OF INSULIN (MULTI): ICD-10-CM

## 2025-06-02 DIAGNOSIS — I10 PRIMARY HYPERTENSION: ICD-10-CM

## 2025-06-02 DIAGNOSIS — E11.9 TYPE 2 DIABETES MELLITUS WITHOUT COMPLICATION, WITHOUT LONG-TERM CURRENT USE OF INSULIN: ICD-10-CM

## 2025-06-02 DIAGNOSIS — E78.00 HYPERCHOLESTEROLEMIA: ICD-10-CM

## 2025-06-02 DIAGNOSIS — E11.22 CONTROLLED TYPE 2 DIABETES MELLITUS WITH STAGE 4 CHRONIC KIDNEY DISEASE, WITHOUT LONG-TERM CURRENT USE OF INSULIN (MULTI): ICD-10-CM

## 2025-06-02 DIAGNOSIS — M81.0 AGE-RELATED OSTEOPOROSIS WITHOUT CURRENT PATHOLOGICAL FRACTURE: ICD-10-CM

## 2025-06-02 RX ORDER — ALENDRONATE SODIUM 70 MG/1
70 TABLET ORAL
Qty: 13 TABLET | Refills: 0 | Status: SHIPPED | OUTPATIENT
Start: 2025-06-02

## 2025-06-02 RX ORDER — AMLODIPINE BESYLATE 10 MG/1
10 TABLET ORAL DAILY
Qty: 90 TABLET | Refills: 3 | Status: SHIPPED | OUTPATIENT
Start: 2025-06-02

## 2025-06-02 RX ORDER — BLOOD SUGAR DIAGNOSTIC
STRIP MISCELLANEOUS
Qty: 100 STRIP | Refills: 3 | Status: SHIPPED | OUTPATIENT
Start: 2025-06-02

## 2025-06-02 RX ORDER — METOPROLOL TARTRATE 100 MG/1
100 TABLET ORAL 2 TIMES DAILY
Qty: 180 TABLET | Refills: 3 | Status: SHIPPED | OUTPATIENT
Start: 2025-06-02

## 2025-06-02 NOTE — TELEPHONE ENCOUNTER
Patient came in for refill request    Metoprolol 100 mg  Take 1 tablet by mouth 2 times a day  Q-180  R-3    Amlodipine 10 mg  Take 1 tablet my mouth once daily  Q-90  R-3    Alendronate 70 mg take 1 tablet by mouth  one (1) time per day     One touch ultra test strip   Use one strip daily as needed if symptomatic    Hyacinth      [facility]

## 2025-06-02 NOTE — TELEPHONE ENCOUNTER
Refill    Metoprolol 100 mg  Take 1 tablet by mouth 2 times a day  Q-180  R-3     Amlodipine 10 mg  Take 1 tablet my mouth once daily  Q-90  R-3     Alendronate 70 mg take 1 tablet by mouth  one (1) time per day      One touch ultra test strip   Use one strip daily as needed if symptomatic    [facility]

## 2025-07-02 DIAGNOSIS — E11.9 TYPE 2 DIABETES MELLITUS WITHOUT COMPLICATION, WITHOUT LONG-TERM CURRENT USE OF INSULIN: ICD-10-CM

## 2025-07-28 ENCOUNTER — APPOINTMENT (OUTPATIENT)
Dept: PRIMARY CARE | Facility: CLINIC | Age: 85
End: 2025-07-28
Payer: MEDICARE

## 2025-07-28 VITALS
HEART RATE: 66 BPM | HEIGHT: 63 IN | BODY MASS INDEX: 32.99 KG/M2 | DIASTOLIC BLOOD PRESSURE: 80 MMHG | SYSTOLIC BLOOD PRESSURE: 126 MMHG | WEIGHT: 186.2 LBS | OXYGEN SATURATION: 93 %

## 2025-07-28 DIAGNOSIS — E11.22 CONTROLLED TYPE 2 DIABETES MELLITUS WITH STAGE 4 CHRONIC KIDNEY DISEASE, WITHOUT LONG-TERM CURRENT USE OF INSULIN (MULTI): ICD-10-CM

## 2025-07-28 DIAGNOSIS — H90.3 BILATERAL SENSORINEURAL HEARING LOSS: ICD-10-CM

## 2025-07-28 DIAGNOSIS — I10 PRIMARY HYPERTENSION: ICD-10-CM

## 2025-07-28 DIAGNOSIS — M81.0 AGE-RELATED OSTEOPOROSIS WITHOUT CURRENT PATHOLOGICAL FRACTURE: ICD-10-CM

## 2025-07-28 DIAGNOSIS — N18.4 CHRONIC KIDNEY DISEASE WITH ACTIVE MEDICAL MANAGEMENT WITHOUT DIALYSIS, STAGE 4 (SEVERE) (MULTI): ICD-10-CM

## 2025-07-28 DIAGNOSIS — E78.00 HYPERCHOLESTEROLEMIA: Primary | ICD-10-CM

## 2025-07-28 DIAGNOSIS — N18.4 CONTROLLED TYPE 2 DIABETES MELLITUS WITH STAGE 4 CHRONIC KIDNEY DISEASE, WITHOUT LONG-TERM CURRENT USE OF INSULIN (MULTI): ICD-10-CM

## 2025-07-28 PROCEDURE — 1159F MED LIST DOCD IN RCRD: CPT | Performed by: INTERNAL MEDICINE

## 2025-07-28 PROCEDURE — 1126F AMNT PAIN NOTED NONE PRSNT: CPT | Performed by: INTERNAL MEDICINE

## 2025-07-28 PROCEDURE — 3079F DIAST BP 80-89 MM HG: CPT | Performed by: INTERNAL MEDICINE

## 2025-07-28 PROCEDURE — 99214 OFFICE O/P EST MOD 30 MIN: CPT | Performed by: INTERNAL MEDICINE

## 2025-07-28 PROCEDURE — 1160F RVW MEDS BY RX/DR IN RCRD: CPT | Performed by: INTERNAL MEDICINE

## 2025-07-28 PROCEDURE — 3074F SYST BP LT 130 MM HG: CPT | Performed by: INTERNAL MEDICINE

## 2025-07-28 PROCEDURE — 1036F TOBACCO NON-USER: CPT | Performed by: INTERNAL MEDICINE

## 2025-07-28 PROCEDURE — G2211 COMPLEX E/M VISIT ADD ON: HCPCS | Performed by: INTERNAL MEDICINE

## 2025-07-28 RX ORDER — METOPROLOL TARTRATE 100 MG/1
100 TABLET ORAL 2 TIMES DAILY
Qty: 180 TABLET | Refills: 3 | Status: SHIPPED | OUTPATIENT
Start: 2025-07-28

## 2025-07-28 RX ORDER — ALENDRONATE SODIUM 70 MG/1
70 TABLET ORAL
Qty: 13 TABLET | Refills: 3 | Status: SHIPPED | OUTPATIENT
Start: 2025-07-28

## 2025-07-28 ASSESSMENT — ENCOUNTER SYMPTOMS
CONSTIPATION: 0
TROUBLE SWALLOWING: 0
ARTHRALGIAS: 0
SHORTNESS OF BREATH: 0
DYSURIA: 0
NAUSEA: 0
SORE THROAT: 0
DIZZINESS: 0
FREQUENCY: 0
COUGH: 0
VOMITING: 0
FEVER: 0
DIARRHEA: 0
FATIGUE: 0
LIGHT-HEADEDNESS: 0
PALPITATIONS: 0
ABDOMINAL PAIN: 0

## 2025-07-28 ASSESSMENT — PAIN SCALES - GENERAL: PAINLEVEL_OUTOF10: 0-NO PAIN

## 2025-07-28 NOTE — ASSESSMENT & PLAN NOTE
Patient was given a refill on her alendronate.  She remains on calcium and vitamin D as well.  Bone density is not due until July 2026

## 2025-07-28 NOTE — PROGRESS NOTES
Subjective   Patient ID: Hyacinth Crisostomo is a 85 y.o. female who presents for 4 month for HTN and diabetes management.    Patient is here for routine 4-month follow-up for hypertension and diabetes.  She feels well and has no new complaints.  Patient is significantly hard of hearing        Review of Systems   Constitutional:  Negative for fatigue and fever.   HENT:  Positive for hearing loss. Negative for sore throat and trouble swallowing.    Eyes:  Negative for visual disturbance.   Respiratory:  Negative for cough and shortness of breath.    Cardiovascular:  Negative for chest pain, palpitations and leg swelling.   Gastrointestinal:  Negative for abdominal pain, constipation, diarrhea, nausea and vomiting.   Genitourinary:  Negative for dysuria and frequency.   Musculoskeletal:  Negative for arthralgias.   Skin:  Negative for rash.   Neurological:  Negative for dizziness and light-headedness.       Objective   Medication Documentation Review Audit       Reviewed by Lucita Vance MD (Physician) on 07/28/25 at 1439      Medication Order Taking? Sig Documenting Provider Last Dose Status   alendronate (Fosamax) 70 mg tablet [id number]  Take 1 tablet (70 mg) by mouth 1 (one) time per week. Lucita Vance MD  Active   amLODIPine (Norvasc) 10 mg tablet [id number]  Take 1 tablet (10 mg) by mouth once daily. Lucita Vance MD  Active   atorvastatin (Lipitor) 10 mg tablet [id number]  Take 1 tablet (10 mg) by mouth once daily. as directed Lucita Vance MD  Active   calcium carbonate-vit D3-min 600 mg calcium- 400 unit tablet [id number] No Take 1 tablet by mouth once daily. Historical Provider, MD Taking Active   cholecalciferol (Vitamin D-3) 10 MCG (400 UNIT) tablet [id number] No Take 1 tablet (10 mcg) by mouth once daily. Historical Provider, MD Taking Active   Combigan 0.2-0.5 % ophthalmic solution [id number] No Administer into affected eye(s). Historical Provider, MD Taking Active   doxazosin (Cardura) 8 mg tablet [id number]  " TAKE 2 TABLETS (16 MG) BY MOUTH ONCE DAILY AT BEDTIME. Lucita Vance MD  Active   glyBURIDE (Diabeta) 5 mg tablet [id number]  Take 1 tablet (5 mg) by mouth 2 times daily (morning and late afternoon). Lucita Vance MD  Active   lancets misc [id number]  Dispense One touch Delico lancets #100 with 3 refills to be used once a day and PRN if symptomatic Lucita Vance MD  Active   Lantus Solostar U-100 Insulin 100 unit/mL (3 mL) pen [id number]  INJECT 15 UNITS UNDER THE SKIN ONCE DAILY AT BEDTIME. TAKE AS DIRECTED PER INSULIN INSTRUCTIONS. DIANA Peterson DNP  Active   metoprolol tartrate (Lopressor) 100 mg tablet [id number]  Take 1 tablet (100 mg) by mouth 2 times a day. Lucita Vance MD  Active   OneTouch Ultra Test [id number]  USE 1 STRIP DAILY AND AS NEEDED IF SYMPTOMATIC Lucita Vance MD  Active   pen needle, diabetic (BD Ultra-Fine Short Pen Needle) 31 gauge x 5/16\" needle [id number]  INJECT 100 EACH UNDER THE SKIN ONCE DAILY AT BEDTIME. DIANA Peterson DNP  Active   prednisoLONE acetate (Pred-Forte) 1 % ophthalmic suspension [id number] No INSTILL 1 DROP INTO LEFT EYE ONCE A DAY Historical Provider, MD Taking Active   SITagliptin phosphate (Januvia) 100 mg tablet [id number]  Take 1 tablet (100 mg) by mouth once daily. DIANA Peterson DNP  Active   travoprost (Travatan Z) 0.004 % drops ophthalmic solution [id number] No Administer 1 drop into both eyes once daily at bedtime. Historical Provider, MD Taking Active                  Allergies[1]    /80   Pulse 66   Ht 1.6 m (5' 3\")   Wt 84.5 kg (186 lb 3.2 oz)   SpO2 93%   BMI 32.98 kg/m²     Physical Exam  Constitutional:       Appearance: Normal appearance. She is obese.   HENT:      Head: Normocephalic and atraumatic.      Nose: Nose normal.     Eyes:      Extraocular Movements: Extraocular movements intact.      Pupils: Pupils are equal, round, and reactive to light.       Cardiovascular:      Rate and Rhythm: Normal rate and regular " rhythm.   Pulmonary:      Breath sounds: Normal breath sounds.   Abdominal:      General: Abdomen is flat. Bowel sounds are normal.      Palpations: Abdomen is soft.     Musculoskeletal:      Right lower leg: No edema.      Left lower leg: No edema.     Neurological:      Mental Status: She is alert.           Assessment/Plan   Problem List Items Addressed This Visit       Age related osteoporosis    Patient was given a refill on her alendronate.  She remains on calcium and vitamin D as well.  Bone density is not due until July 2026         Relevant Medications    alendronate (Fosamax) 70 mg tablet    Bilateral sensorineural hearing loss    Chronic kidney disease with active medical management without dialysis, stage 4 (severe) (Multi)    Relevant Medications    metoprolol tartrate (Lopressor) 100 mg tablet    Controlled diabetes mellitus with chronic kidney disease (Multi)    Patient is due for A1c now and was asked to get labs done today         Relevant Medications    metoprolol tartrate (Lopressor) 100 mg tablet    Hypercholesterolemia - Primary    Cholesterol profile was obtained in March.  Patient is well-controlled on a atorvastatin 10 mg daily and she denies muscle aches or other side effects         Hypertension    Patient was given a refill on her metoprolol.  Blood pressure is stable and well-controlled         Relevant Medications    metoprolol tartrate (Lopressor) 100 mg tablet              It has been a pleasure seeing you.  Lucita Vance MD        [1]   Allergies  Allergen Reactions    Cpm-Pseudoephed-Acetaminophen Hives    Lisinopril Other     Hyperkalemia

## 2025-07-28 NOTE — PATIENT INSTRUCTIONS
Please get A1c today, it was ordered in June.    Follow up Dr Vance in 4 months for DM etc  30 min appointment

## 2025-07-28 NOTE — ASSESSMENT & PLAN NOTE
Cholesterol profile was obtained in March.  Patient is well-controlled on a atorvastatin 10 mg daily and she denies muscle aches or other side effects

## 2025-07-29 DIAGNOSIS — E11.22 CONTROLLED TYPE 2 DIABETES MELLITUS WITH STAGE 4 CHRONIC KIDNEY DISEASE, WITHOUT LONG-TERM CURRENT USE OF INSULIN (MULTI): Primary | ICD-10-CM

## 2025-07-29 DIAGNOSIS — N18.4 CONTROLLED TYPE 2 DIABETES MELLITUS WITH STAGE 4 CHRONIC KIDNEY DISEASE, WITHOUT LONG-TERM CURRENT USE OF INSULIN (MULTI): Primary | ICD-10-CM

## 2025-07-29 LAB
EST. AVERAGE GLUCOSE BLD GHB EST-MCNC: 194 MG/DL
EST. AVERAGE GLUCOSE BLD GHB EST-SCNC: 10.8 MMOL/L
HBA1C MFR BLD: 8.4 %

## 2025-08-04 ENCOUNTER — APPOINTMENT (OUTPATIENT)
Dept: PRIMARY CARE | Facility: CLINIC | Age: 85
End: 2025-08-04
Payer: MEDICARE

## 2025-08-04 DIAGNOSIS — E11.9 TYPE 2 DIABETES MELLITUS WITHOUT COMPLICATION, WITH LONG-TERM CURRENT USE OF INSULIN: Primary | ICD-10-CM

## 2025-08-04 DIAGNOSIS — Z79.4 TYPE 2 DIABETES MELLITUS WITHOUT COMPLICATION, WITH LONG-TERM CURRENT USE OF INSULIN: Primary | ICD-10-CM

## 2025-08-04 NOTE — PROGRESS NOTES
Clinical Pharmacy Appointment    Patient ID: Hyacinth Crisostomo is a 85 y.o. female who presents for Diabetes.    Pt is here for First appointment.     Referring Provider: Lucita Vance MD  PCP: Lucita Vance MD  Last visit with PCP: 7/28/25   Next visit with PCP: 12/1/25    Subjective   Drug Interactions  No relevant drug interactions were noted.    Medication System Management  Patient's preferred pharmacy: [facility]  Adherence/Organization: complete adherence  Affordability/Accessibility: denies    Interval History      HPI  DIABETES MELLITUS TYPE 2:    Known diabetic complications: CKD, HLD, HTN.  Does patient follow with Endocrinology: No  Last optometry exam: 4/2025  Most recent visit in Podiatry: denies-- patient denies sores or cuts on feet today      Eating the wrong stuff. Eating 1 apple per day. Eating apples because she feels it helps with her kidneys.    Current diabetic medications include:  Lantus 15 units once daily  Glyburide 5 mg twice daily with food  Januvia 100 mg once daily    Clarifications to above regimen: none  Adverse Effects: denies    Past diabetic medications include:  Farxiga and metformin stopped d/t renal function     Glucose Readings:  Glucometer/CGM Type: glucometer  Patient tests BG 1 times per day (AMFBG)    Current home BG readings (mg/dL): 147 8/4/25  If she has an evening snack, AMFBG generally around 250  If she doesn't have a snack in the evenings, generally around 125-135    Any episodes of hypoglycemia? No, denies.  Did patient treat episode of hypoglycemia appropriately? N/A    Lifestyle:  Diet: 2 meals/day. Son-in-law cooks at home and often cooks pasta  BK: skip  LN: ham sandwich, raw vegetables, small bag of chips  DN: protein, vegetable  Snacks:   Belvita breakfast cracker/cookie  Drinks:   Diet tea  Diet flavoring  Exercise: does not exercise  Is limited by: none  Tobacco history: denies    Secondary Prevention:  Statin? Yes  ACE-I/ARB? No  Aspirin? No    Pertinent PMH  Review:  PMH of Pancreatitis: No  PMH of Retinopathy: No  PMH of Urinary Tract Infections: No  PMH of MTC: No  PMH of MEN2: No  UACR/EGFR in last year?: Yes  ALBUMIN/CREATININE RATIO, RANDOM URINE   Date Value Ref Range Status   03/31/2025 157 (H) <30 mg/g creat Final     Comment:        The ADA defines abnormalities in albumin  excretion as follows:     Albuminuria Category        Result (mg/g creatinine)     Normal to Mildly increased   <30  Moderately increased            Severely increased           > OR = 300     The ADA recommends that at least two of three  specimens collected within a 3-6 month period be  abnormal before considering a patient to be  within a diagnostic category.     03/10/2025 93 (H) <30 mg/g creat Final     Comment:        The ADA defines abnormalities in albumin  excretion as follows:     Albuminuria Category        Result (mg/g creatinine)     Normal to Mildly increased   <30  Moderately increased            Severely increased           > OR = 300     The ADA recommends that at least two of three  specimens collected within a 3-6 month period be  abnormal before considering a patient to be  within a diagnostic category.       Albumin/Creatinine Ratio   Date Value Ref Range Status   02/23/2024 72.5 (H) <30.0 ug/mg Creat Final       Immunizations:  Influenza? Yes  COVID? Yes  Pneumonia? Yes  Shingles? Yes    Objective   Allergies[1]  Social History     Social History Narrative    Not on file      Medication Review  Current Outpatient Medications   Medication Instructions    alendronate (FOSAMAX) 70 mg, oral, Once Weekly    amLODIPine (NORVASC) 10 mg, oral, Daily    atorvastatin (LIPITOR) 10 mg, oral, Daily, as directed    calcium carbonate-vit D3-min 600 mg calcium- 400 unit tablet 1 tablet, Daily    cholecalciferol (Vitamin D-3) 10 MCG (400 UNIT) tablet 1 tablet, Daily    Combigan 0.2-0.5 % ophthalmic solution Administer into affected eye(s).    doxazosin (CARDURA) 16 mg, oral,  "Nightly    glyBURIDE (DIABETA) 5 mg, oral, 2 times daily (morning and late afternoon)    lancets misc Dispense One touch Delico lancets #100 with 3 refills to be used once a day and PRN if symptomatic    Lantus Solostar U-100 Insulin 15 Units, subcutaneous, Nightly, Take as directed per insulin instructions.    metoprolol tartrate (LOPRESSOR) 100 mg, oral, 2 times daily    OneTouch Ultra Test USE 1 STRIP DAILY AND AS NEEDED IF SYMPTOMATIC    pen needle, diabetic (BD Ultra-Fine Short Pen Needle) 31 gauge x 5/16\" needle INJECT 100 EACH UNDER THE SKIN ONCE DAILY AT BEDTIME.    prednisoLONE acetate (Pred-Forte) 1 % ophthalmic suspension INSTILL 1 DROP INTO LEFT EYE ONCE A DAY    SITagliptin phosphate (JANUVIA) 100 mg, oral, Daily    travoprost (Travatan Z) 0.004 % drops ophthalmic solution 1 drop, Nightly      Vitals  BP Readings from Last 2 Encounters:   07/28/25 126/80   03/31/25 122/64     BMI Readings from Last 1 Encounters:   07/28/25 32.98 kg/m²      Labs  A1C  Lab Results   Component Value Date    HGBA1C 8.4 (H) 07/28/2025    HGBA1C 8.2 (H) 03/31/2025    HGBA1C 6.9 (H) 10/07/2024     BMP  Lab Results   Component Value Date    CALCIUM 9.8 03/31/2025     03/31/2025    K 5.0 03/31/2025    CO2 27 03/31/2025     03/31/2025    BUN 30 (H) 03/31/2025    CREATININE 1.70 (H) 03/31/2025    EGFR 29 (L) 03/31/2025     LFTs  Lab Results   Component Value Date    ALT 17 03/31/2025    AST 18 03/31/2025    ALKPHOS 89 03/31/2025    BILITOT 0.4 03/31/2025     FLP  Lab Results   Component Value Date    TRIG 97 03/31/2025    CHOL 111 03/31/2025    LDLF 53 01/16/2023    LDLCALC 56 03/31/2025    HDL 37 (L) 03/31/2025     Urine Microalbumin  Lab Results   Component Value Date    MICROALBCREA 157 (H) 03/31/2025     Weight Management  Wt Readings from Last 3 Encounters:   07/28/25 84.5 kg (186 lb 3.2 oz)   03/31/25 81.6 kg (179 lb 12.8 oz)   03/13/25 81 kg (178 lb 9.6 oz)      There is no height or weight on file to calculate " BMI.     Assessment/Plan   Problem List Items Addressed This Visit    None  Visit Diagnoses         Type 2 diabetes mellitus without complication, with long-term current use of insulin    -  Primary    Relevant Orders    Referral to Clinical Pharmacy        Patients diabetes needs improvement with most recent A1c of 8.4% (Goal < 7%). D/t renal function, will dose adjust Januvia. Additionally, will have patient increase basal insulin dose to target BG elevation.   Continue:   Glyburide 5 mg twice daily  Increase:  Lantus 17 units once daily  Decrease:  Januvia 100 mg 1/2 tablet once daily  Compliance at present is estimated to be excellent. Efforts to improve compliance (if necessary) will be directed at dietary modifications: reducing carbohydrate intake.  Education Provided to Patient:   Counseled on carbohydrate sources in diet  Patient to bring glucose log to follow-up appointment     Clinical Pharmacist follow-up: 8/25/25, In-Person visit  Patient is not followed in [facility].     Continue all meds under the continuation of care with the referring provider and clinical pharmacy team.    Thank you,  Jared Das, PharmD    Verbal consent to manage patient's drug therapy was obtained from the patient. They were informed they may decline to participate or withdraw from participation in pharmacy services at any time.        [1]   Allergies  Allergen Reactions    Cpm-Pseudoephed-Acetaminophen Hives    Lisinopril Other     Hyperkalemia

## 2025-08-04 NOTE — PATIENT INSTRUCTIONS
Cut Januvia in half, take 0.5 tablet once daily.  Increase Lantus to 17 units daily  Decrease intake of bread, pasta, rice, sugar/sweets, corn, and potatoes  Take blood sugar once daily in the mornings and keep track of it on a piece of paper, bring blood sugar log to next appointment.   Follow-up with Jared Pharmacist on August 25 at 1 pm

## 2025-08-25 ENCOUNTER — APPOINTMENT (OUTPATIENT)
Dept: PRIMARY CARE | Facility: CLINIC | Age: 85
End: 2025-08-25
Payer: MEDICARE

## 2025-08-25 DIAGNOSIS — Z79.4 TYPE 2 DIABETES MELLITUS WITHOUT COMPLICATION, WITH LONG-TERM CURRENT USE OF INSULIN: Primary | ICD-10-CM

## 2025-08-25 DIAGNOSIS — N18.4 CHRONIC KIDNEY DISEASE WITH ACTIVE MEDICAL MANAGEMENT WITHOUT DIALYSIS, STAGE 4 (SEVERE) (MULTI): ICD-10-CM

## 2025-08-25 DIAGNOSIS — E11.9 TYPE 2 DIABETES MELLITUS WITHOUT COMPLICATION, WITH LONG-TERM CURRENT USE OF INSULIN: Primary | ICD-10-CM

## 2025-09-15 ENCOUNTER — APPOINTMENT (OUTPATIENT)
Dept: NEPHROLOGY | Facility: CLINIC | Age: 85
End: 2025-09-15
Payer: MEDICARE

## 2025-10-20 ENCOUNTER — APPOINTMENT (OUTPATIENT)
Dept: PRIMARY CARE | Facility: CLINIC | Age: 85
End: 2025-10-20
Payer: MEDICARE

## 2025-12-01 ENCOUNTER — APPOINTMENT (OUTPATIENT)
Dept: PRIMARY CARE | Facility: CLINIC | Age: 85
End: 2025-12-01
Payer: MEDICARE